# Patient Record
Sex: FEMALE | Race: WHITE | NOT HISPANIC OR LATINO
[De-identification: names, ages, dates, MRNs, and addresses within clinical notes are randomized per-mention and may not be internally consistent; named-entity substitution may affect disease eponyms.]

---

## 2017-03-16 ENCOUNTER — RESULT REVIEW (OUTPATIENT)
Age: 67
End: 2017-03-16

## 2017-06-30 ENCOUNTER — APPOINTMENT (OUTPATIENT)
Dept: GASTROENTEROLOGY | Facility: CLINIC | Age: 67
End: 2017-06-30

## 2017-06-30 DIAGNOSIS — K57.30 DIVERTICULOSIS OF LARGE INTESTINE W/OUT PERFORATION OR ABSCESS W/OUT BLEEDING: ICD-10-CM

## 2017-07-30 LAB
ALBUMIN SERPL ELPH-MCNC: 4.9 G/DL
ALP BLD-CCNC: 80 U/L
ALT SERPL-CCNC: 40 U/L
ANION GAP SERPL CALC-SCNC: 16 MMOL/L
AST SERPL-CCNC: 31 U/L
BASOPHILS # BLD AUTO: 0.02 K/UL
BASOPHILS NFR BLD AUTO: 0.3 %
BILIRUB SERPL-MCNC: 0.6 MG/DL
BUN SERPL-MCNC: 13 MG/DL
CALCIUM SERPL-MCNC: 10 MG/DL
CHLORIDE SERPL-SCNC: 100 MMOL/L
CO2 SERPL-SCNC: 25 MMOL/L
CREAT SERPL-MCNC: 0.88 MG/DL
EOSINOPHIL # BLD AUTO: 0.09 K/UL
EOSINOPHIL NFR BLD AUTO: 1.3 %
GLUCOSE SERPL-MCNC: 77 MG/DL
HCT VFR BLD CALC: 45.9 %
HGB BLD-MCNC: 15.5 G/DL
IMM GRANULOCYTES NFR BLD AUTO: 0.4 %
INR PPP: 0.91 RATIO
LYMPHOCYTES # BLD AUTO: 2.08 K/UL
LYMPHOCYTES NFR BLD AUTO: 29.1 %
MAN DIFF?: NORMAL
MCHC RBC-ENTMCNC: 30.6 PG
MCHC RBC-ENTMCNC: 33.8 GM/DL
MCV RBC AUTO: 90.5 FL
MONOCYTES # BLD AUTO: 0.55 K/UL
MONOCYTES NFR BLD AUTO: 7.7 %
NEUTROPHILS # BLD AUTO: 4.38 K/UL
NEUTROPHILS NFR BLD AUTO: 61.2 %
PLATELET # BLD AUTO: 138 K/UL
POTASSIUM SERPL-SCNC: 4.8 MMOL/L
PROT SERPL-MCNC: 7.6 G/DL
PT BLD: 10.3 SEC
RBC # BLD: 5.07 M/UL
RBC # FLD: 12.7 %
SODIUM SERPL-SCNC: 141 MMOL/L
WBC # FLD AUTO: 7.15 K/UL

## 2017-08-08 ENCOUNTER — OUTPATIENT (OUTPATIENT)
Dept: OUTPATIENT SERVICES | Facility: HOSPITAL | Age: 67
LOS: 1 days | End: 2017-08-08
Payer: MEDICARE

## 2017-08-08 ENCOUNTER — APPOINTMENT (OUTPATIENT)
Dept: GASTROENTEROLOGY | Facility: GI CENTER | Age: 67
End: 2017-08-08
Payer: MEDICARE

## 2017-08-08 DIAGNOSIS — Z12.11 ENCOUNTER FOR SCREENING FOR MALIGNANT NEOPLASM OF COLON: ICD-10-CM

## 2017-08-08 PROCEDURE — 45378 DIAGNOSTIC COLONOSCOPY: CPT

## 2017-08-08 PROCEDURE — G0105: CPT

## 2018-04-16 ENCOUNTER — RESULT REVIEW (OUTPATIENT)
Age: 68
End: 2018-04-16

## 2019-02-04 ENCOUNTER — TRANSCRIPTION ENCOUNTER (OUTPATIENT)
Age: 69
End: 2019-02-04

## 2019-04-24 ENCOUNTER — APPOINTMENT (OUTPATIENT)
Dept: RADIOLOGY | Facility: CLINIC | Age: 69
End: 2019-04-24
Payer: MEDICARE

## 2019-04-24 ENCOUNTER — OUTPATIENT (OUTPATIENT)
Dept: OUTPATIENT SERVICES | Facility: HOSPITAL | Age: 69
LOS: 1 days | End: 2019-04-24
Payer: MEDICARE

## 2019-04-24 ENCOUNTER — APPOINTMENT (OUTPATIENT)
Dept: MAMMOGRAPHY | Facility: CLINIC | Age: 69
End: 2019-04-24
Payer: MEDICARE

## 2019-04-24 DIAGNOSIS — Z12.31 ENCOUNTER FOR SCREENING MAMMOGRAM FOR MALIGNANT NEOPLASM OF BREAST: ICD-10-CM

## 2019-04-24 DIAGNOSIS — Z13.820 ENCOUNTER FOR SCREENING FOR OSTEOPOROSIS: ICD-10-CM

## 2019-04-24 PROCEDURE — 77067 SCR MAMMO BI INCL CAD: CPT | Mod: 26

## 2019-04-24 PROCEDURE — 77063 BREAST TOMOSYNTHESIS BI: CPT | Mod: 26

## 2019-04-24 PROCEDURE — 77067 SCR MAMMO BI INCL CAD: CPT

## 2019-04-24 PROCEDURE — 77080 DXA BONE DENSITY AXIAL: CPT | Mod: 26

## 2019-04-24 PROCEDURE — 77080 DXA BONE DENSITY AXIAL: CPT

## 2019-04-24 PROCEDURE — 77063 BREAST TOMOSYNTHESIS BI: CPT

## 2020-06-23 ENCOUNTER — RESULT REVIEW (OUTPATIENT)
Age: 70
End: 2020-06-23

## 2020-07-09 ENCOUNTER — APPOINTMENT (OUTPATIENT)
Dept: MAMMOGRAPHY | Facility: CLINIC | Age: 70
End: 2020-07-09
Payer: MEDICARE

## 2020-07-09 ENCOUNTER — OUTPATIENT (OUTPATIENT)
Dept: OUTPATIENT SERVICES | Facility: HOSPITAL | Age: 70
LOS: 1 days | End: 2020-07-09
Payer: MEDICARE

## 2020-07-09 DIAGNOSIS — Z12.31 ENCOUNTER FOR SCREENING MAMMOGRAM FOR MALIGNANT NEOPLASM OF BREAST: ICD-10-CM

## 2020-07-09 PROCEDURE — 77063 BREAST TOMOSYNTHESIS BI: CPT | Mod: 26

## 2020-07-09 PROCEDURE — 77067 SCR MAMMO BI INCL CAD: CPT | Mod: 26

## 2020-07-09 PROCEDURE — 77067 SCR MAMMO BI INCL CAD: CPT

## 2020-07-09 PROCEDURE — 77063 BREAST TOMOSYNTHESIS BI: CPT

## 2020-10-02 ENCOUNTER — RESULT REVIEW (OUTPATIENT)
Age: 70
End: 2020-10-02

## 2021-01-27 ENCOUNTER — APPOINTMENT (OUTPATIENT)
Dept: DERMATOLOGY | Facility: CLINIC | Age: 71
End: 2021-01-27
Payer: MEDICARE

## 2021-01-27 ENCOUNTER — RESULT REVIEW (OUTPATIENT)
Age: 71
End: 2021-01-27

## 2021-01-27 PROCEDURE — 99203 OFFICE O/P NEW LOW 30 MIN: CPT | Mod: 25

## 2021-01-27 PROCEDURE — 17261 DSTRJ MAL LES T/A/L .6-1.0CM: CPT

## 2021-01-27 PROCEDURE — 11306 SHAVE SKIN LESION 0.6-1.0 CM: CPT

## 2021-01-27 PROCEDURE — 99072 ADDL SUPL MATRL&STAF TM PHE: CPT

## 2021-02-12 ENCOUNTER — TRANSCRIPTION ENCOUNTER (OUTPATIENT)
Age: 71
End: 2021-02-12

## 2021-06-25 ENCOUNTER — RESULT REVIEW (OUTPATIENT)
Age: 71
End: 2021-06-25

## 2021-07-12 ENCOUNTER — APPOINTMENT (OUTPATIENT)
Dept: MAMMOGRAPHY | Facility: CLINIC | Age: 71
End: 2021-07-12
Payer: MEDICARE

## 2021-07-12 ENCOUNTER — OUTPATIENT (OUTPATIENT)
Dept: OUTPATIENT SERVICES | Facility: HOSPITAL | Age: 71
LOS: 1 days | End: 2021-07-12
Payer: MEDICARE

## 2021-07-12 ENCOUNTER — APPOINTMENT (OUTPATIENT)
Dept: RADIOLOGY | Facility: CLINIC | Age: 71
End: 2021-07-12
Payer: MEDICARE

## 2021-07-12 DIAGNOSIS — Z12.31 ENCOUNTER FOR SCREENING MAMMOGRAM FOR MALIGNANT NEOPLASM OF BREAST: ICD-10-CM

## 2021-07-12 DIAGNOSIS — Z13.820 ENCOUNTER FOR SCREENING FOR OSTEOPOROSIS: ICD-10-CM

## 2021-07-12 DIAGNOSIS — Z00.8 ENCOUNTER FOR OTHER GENERAL EXAMINATION: ICD-10-CM

## 2021-07-12 PROCEDURE — 77063 BREAST TOMOSYNTHESIS BI: CPT | Mod: 26

## 2021-07-12 PROCEDURE — 77063 BREAST TOMOSYNTHESIS BI: CPT

## 2021-07-12 PROCEDURE — 77067 SCR MAMMO BI INCL CAD: CPT | Mod: 26

## 2021-07-12 PROCEDURE — 77080 DXA BONE DENSITY AXIAL: CPT | Mod: 26

## 2021-07-12 PROCEDURE — 77067 SCR MAMMO BI INCL CAD: CPT

## 2021-07-12 PROCEDURE — 77080 DXA BONE DENSITY AXIAL: CPT

## 2022-03-31 ENCOUNTER — TRANSCRIPTION ENCOUNTER (OUTPATIENT)
Age: 72
End: 2022-03-31

## 2022-04-17 ENCOUNTER — TRANSCRIPTION ENCOUNTER (OUTPATIENT)
Age: 72
End: 2022-04-17

## 2022-05-16 ENCOUNTER — INPATIENT (INPATIENT)
Facility: HOSPITAL | Age: 72
LOS: 1 days | Discharge: HOME | DRG: 419 | End: 2022-05-18
Attending: SURGERY | Admitting: SURGERY
Payer: MEDICARE

## 2022-05-16 ENCOUNTER — TRANSCRIPTION ENCOUNTER (OUTPATIENT)
Age: 72
End: 2022-05-16

## 2022-05-16 VITALS
HEART RATE: 75 BPM | HEIGHT: 65 IN | DIASTOLIC BLOOD PRESSURE: 67 MMHG | TEMPERATURE: 98 F | SYSTOLIC BLOOD PRESSURE: 166 MMHG | OXYGEN SATURATION: 98 % | WEIGHT: 130.07 LBS | RESPIRATION RATE: 20 BRPM

## 2022-05-16 DIAGNOSIS — Z90.721 ACQUIRED ABSENCE OF OVARIES, UNILATERAL: Chronic | ICD-10-CM

## 2022-05-16 DIAGNOSIS — Z98.51 TUBAL LIGATION STATUS: Chronic | ICD-10-CM

## 2022-05-16 DIAGNOSIS — K81.9 CHOLECYSTITIS, UNSPECIFIED: ICD-10-CM

## 2022-05-16 LAB
ALBUMIN SERPL ELPH-MCNC: 4.3 G/DL — SIGNIFICANT CHANGE UP (ref 3.3–5.2)
ALP SERPL-CCNC: 85 U/L — SIGNIFICANT CHANGE UP (ref 40–120)
ALT FLD-CCNC: 35 U/L — HIGH
ANION GAP SERPL CALC-SCNC: 15 MMOL/L — SIGNIFICANT CHANGE UP (ref 5–17)
AST SERPL-CCNC: 33 U/L — HIGH
BASOPHILS # BLD AUTO: 0.05 K/UL — SIGNIFICANT CHANGE UP (ref 0–0.2)
BASOPHILS NFR BLD AUTO: 0.4 % — SIGNIFICANT CHANGE UP (ref 0–2)
BILIRUB SERPL-MCNC: 0.5 MG/DL — SIGNIFICANT CHANGE UP (ref 0.4–2)
BLD GP AB SCN SERPL QL: SIGNIFICANT CHANGE UP
BUN SERPL-MCNC: 12.6 MG/DL — SIGNIFICANT CHANGE UP (ref 8–20)
CALCIUM SERPL-MCNC: 9.2 MG/DL — SIGNIFICANT CHANGE UP (ref 8.6–10.2)
CHLORIDE SERPL-SCNC: 102 MMOL/L — SIGNIFICANT CHANGE UP (ref 98–107)
CO2 SERPL-SCNC: 23 MMOL/L — SIGNIFICANT CHANGE UP (ref 22–29)
CREAT SERPL-MCNC: 0.7 MG/DL — SIGNIFICANT CHANGE UP (ref 0.5–1.3)
EGFR: 91 ML/MIN/1.73M2 — SIGNIFICANT CHANGE UP
EOSINOPHIL # BLD AUTO: 0.02 K/UL — SIGNIFICANT CHANGE UP (ref 0–0.5)
EOSINOPHIL NFR BLD AUTO: 0.2 % — SIGNIFICANT CHANGE UP (ref 0–6)
GLUCOSE SERPL-MCNC: 127 MG/DL — HIGH (ref 70–99)
HCT VFR BLD CALC: 50.8 % — HIGH (ref 34.5–45)
HGB BLD-MCNC: 16.9 G/DL — HIGH (ref 11.5–15.5)
IMM GRANULOCYTES NFR BLD AUTO: 0.4 % — SIGNIFICANT CHANGE UP (ref 0–1.5)
LIDOCAIN IGE QN: 22 U/L — SIGNIFICANT CHANGE UP (ref 22–51)
LYMPHOCYTES # BLD AUTO: 1.18 K/UL — SIGNIFICANT CHANGE UP (ref 1–3.3)
LYMPHOCYTES # BLD AUTO: 10.2 % — LOW (ref 13–44)
MCHC RBC-ENTMCNC: 30.1 PG — SIGNIFICANT CHANGE UP (ref 27–34)
MCHC RBC-ENTMCNC: 33.3 GM/DL — SIGNIFICANT CHANGE UP (ref 32–36)
MCV RBC AUTO: 90.6 FL — SIGNIFICANT CHANGE UP (ref 80–100)
MONOCYTES # BLD AUTO: 0.48 K/UL — SIGNIFICANT CHANGE UP (ref 0–0.9)
MONOCYTES NFR BLD AUTO: 4.1 % — SIGNIFICANT CHANGE UP (ref 2–14)
NEUTROPHILS # BLD AUTO: 9.82 K/UL — HIGH (ref 1.8–7.4)
NEUTROPHILS NFR BLD AUTO: 84.7 % — HIGH (ref 43–77)
PLATELET # BLD AUTO: 169 K/UL — SIGNIFICANT CHANGE UP (ref 150–400)
POTASSIUM SERPL-MCNC: 4.1 MMOL/L — SIGNIFICANT CHANGE UP (ref 3.5–5.3)
POTASSIUM SERPL-SCNC: 4.1 MMOL/L — SIGNIFICANT CHANGE UP (ref 3.5–5.3)
PROT SERPL-MCNC: 7.4 G/DL — SIGNIFICANT CHANGE UP (ref 6.6–8.7)
RBC # BLD: 5.61 M/UL — HIGH (ref 3.8–5.2)
RBC # FLD: 12.2 % — SIGNIFICANT CHANGE UP (ref 10.3–14.5)
SARS-COV-2 RNA SPEC QL NAA+PROBE: SIGNIFICANT CHANGE UP
SODIUM SERPL-SCNC: 140 MMOL/L — SIGNIFICANT CHANGE UP (ref 135–145)
WBC # BLD: 11.6 K/UL — HIGH (ref 3.8–10.5)
WBC # FLD AUTO: 11.6 K/UL — HIGH (ref 3.8–10.5)

## 2022-05-16 PROCEDURE — 76705 ECHO EXAM OF ABDOMEN: CPT | Mod: 26

## 2022-05-16 PROCEDURE — 71045 X-RAY EXAM CHEST 1 VIEW: CPT | Mod: 26

## 2022-05-16 PROCEDURE — 74181 MRI ABDOMEN W/O CONTRAST: CPT | Mod: 26

## 2022-05-16 PROCEDURE — 99285 EMERGENCY DEPT VISIT HI MDM: CPT

## 2022-05-16 RX ORDER — OXYCODONE HYDROCHLORIDE 5 MG/1
5 TABLET ORAL EVERY 6 HOURS
Refills: 0 | Status: DISCONTINUED | OUTPATIENT
Start: 2022-05-16 | End: 2022-05-17

## 2022-05-16 RX ORDER — CEFOTETAN DISODIUM 1 G
VIAL (EA) INJECTION
Refills: 0 | Status: DISCONTINUED | OUTPATIENT
Start: 2022-05-16 | End: 2022-05-17

## 2022-05-16 RX ORDER — ENOXAPARIN SODIUM 100 MG/ML
40 INJECTION SUBCUTANEOUS EVERY 24 HOURS
Refills: 0 | Status: DISCONTINUED | OUTPATIENT
Start: 2022-05-16 | End: 2022-05-17

## 2022-05-16 RX ORDER — ACETAMINOPHEN 500 MG
650 TABLET ORAL EVERY 6 HOURS
Refills: 0 | Status: DISCONTINUED | OUTPATIENT
Start: 2022-05-16 | End: 2022-05-17

## 2022-05-16 RX ORDER — SODIUM CHLORIDE 9 MG/ML
1000 INJECTION, SOLUTION INTRAVENOUS
Refills: 0 | Status: DISCONTINUED | OUTPATIENT
Start: 2022-05-16 | End: 2022-05-17

## 2022-05-16 RX ORDER — TRAMADOL HYDROCHLORIDE 50 MG/1
50 TABLET ORAL EVERY 6 HOURS
Refills: 0 | Status: DISCONTINUED | OUTPATIENT
Start: 2022-05-16 | End: 2022-05-17

## 2022-05-16 RX ORDER — CEFOTETAN DISODIUM 1 G
2 VIAL (EA) INJECTION ONCE
Refills: 0 | Status: COMPLETED | OUTPATIENT
Start: 2022-05-16 | End: 2022-05-16

## 2022-05-16 RX ORDER — CEFOTETAN DISODIUM 1 G
2 VIAL (EA) INJECTION EVERY 12 HOURS
Refills: 0 | Status: DISCONTINUED | OUTPATIENT
Start: 2022-05-17 | End: 2022-05-17

## 2022-05-16 RX ORDER — KETOROLAC TROMETHAMINE 30 MG/ML
30 SYRINGE (ML) INJECTION ONCE
Refills: 0 | Status: DISCONTINUED | OUTPATIENT
Start: 2022-05-16 | End: 2022-05-16

## 2022-05-16 RX ORDER — MORPHINE SULFATE 50 MG/1
2 CAPSULE, EXTENDED RELEASE ORAL ONCE
Refills: 0 | Status: DISCONTINUED | OUTPATIENT
Start: 2022-05-16 | End: 2022-05-16

## 2022-05-16 RX ORDER — PIPERACILLIN AND TAZOBACTAM 4; .5 G/20ML; G/20ML
3.38 INJECTION, POWDER, LYOPHILIZED, FOR SOLUTION INTRAVENOUS ONCE
Refills: 0 | Status: DISCONTINUED | OUTPATIENT
Start: 2022-05-16 | End: 2022-05-16

## 2022-05-16 RX ORDER — ATORVASTATIN CALCIUM 80 MG/1
20 TABLET, FILM COATED ORAL AT BEDTIME
Refills: 0 | Status: DISCONTINUED | OUTPATIENT
Start: 2022-05-16 | End: 2022-05-17

## 2022-05-16 RX ORDER — SODIUM CHLORIDE 9 MG/ML
1000 INJECTION INTRAMUSCULAR; INTRAVENOUS; SUBCUTANEOUS ONCE
Refills: 0 | Status: COMPLETED | OUTPATIENT
Start: 2022-05-16 | End: 2022-05-16

## 2022-05-16 RX ADMIN — Medication 30 MILLIGRAM(S): at 09:21

## 2022-05-16 RX ADMIN — TRAMADOL HYDROCHLORIDE 50 MILLIGRAM(S): 50 TABLET ORAL at 22:02

## 2022-05-16 RX ADMIN — MORPHINE SULFATE 2 MILLIGRAM(S): 50 CAPSULE, EXTENDED RELEASE ORAL at 09:20

## 2022-05-16 RX ADMIN — ATORVASTATIN CALCIUM 20 MILLIGRAM(S): 80 TABLET, FILM COATED ORAL at 21:03

## 2022-05-16 RX ADMIN — SODIUM CHLORIDE 75 MILLILITER(S): 9 INJECTION, SOLUTION INTRAVENOUS at 14:13

## 2022-05-16 RX ADMIN — SODIUM CHLORIDE 1000 MILLILITER(S): 9 INJECTION INTRAMUSCULAR; INTRAVENOUS; SUBCUTANEOUS at 09:21

## 2022-05-16 RX ADMIN — Medication 100 GRAM(S): at 17:00

## 2022-05-16 RX ADMIN — ENOXAPARIN SODIUM 40 MILLIGRAM(S): 100 INJECTION SUBCUTANEOUS at 17:00

## 2022-05-16 RX ADMIN — TRAMADOL HYDROCHLORIDE 50 MILLIGRAM(S): 50 TABLET ORAL at 21:02

## 2022-05-16 NOTE — ED ADULT NURSE REASSESSMENT NOTE - NS ED NURSE REASSESS COMMENT FT1
report given to everett kelly at 18:50. pt moved to cdu 7 L. rr even and unlabored. rn made aware of transfer. pt educated on plan of care, pt able to successfully teach back plan of care to RN, RN will continue to reeducate pt during hospital stay.

## 2022-05-16 NOTE — H&P ADULT - ASSESSMENT
73yo F w/ PMH of hypercholesterolemia presents with one day of abdominal pain,, found with cholelithiasis/sludge and diffuse GBW thickening. These findings as well as leukocytosis and RUQ pain concerning for acute cholecystitis. Plan for cholecystectomy which patient is agreeable to. Also noted with significantly dilated CBD without obstructive lab findings. TO obtain MRCP to r/o structural causes fo dilation.    # Acute cholecystitis  - Admit to [facility] under Dr. Abreu  - Plan for OR tomorrow 5/17 for lap quique, poss open  - CLD, NPO at midnight / IVF  - IV abx  - Pain control PRN  - DVT ppx  - Resumed pertinent home meds  - MRCP ordered, will f/u    Patient seen and plan discussed with Dr. Abreu who agrees.   Consulted at 12:00 seen at 12:45.

## 2022-05-16 NOTE — ED STATDOCS - ATTENDING APP SHARED VISIT CONTRIBUTION OF CARE
This was a shared visit with TEREZA. I reviewed and verified the documentation and independently performed the documented history/exam/mdm.

## 2022-05-16 NOTE — H&P ADULT - NSHPSOCIALHISTORY_GEN_ALL_CORE
Smoked 5-10 cigarette / day, off and on but for a total of about 20 years. Drinks alcohol socially: 2-3 glasses of wine weekly.  Denies illicit drug use.

## 2022-05-16 NOTE — ED ADULT NURSE NOTE - NSIMPLEMENTINTERV_GEN_ALL_ED
Implemented All Universal Safety Interventions:  [place] to call system. Call bell, personal items and telephone within reach. Instruct patient to call for assistance. Room bathroom lighting operational. Non-slip footwear when patient is off stretcher. Physically safe environment: no spills, clutter or unnecessary equipment. Stretcher in lowest position, wheels locked, appropriate side rails in place.

## 2022-05-16 NOTE — H&P ADULT - NSICDXPASTSURGICALHX_GEN_ALL_CORE_FT
PAST SURGICAL HISTORY:  H/O tubal ligation     History of right salpingo-oophorectomy 2/2 ectopic pregnancy

## 2022-05-16 NOTE — ED ADULT NURSE NOTE - OBJECTIVE STATEMENT
assumed care of pt at 9:17. pt reports lower abd pain radiating to right flank since Thursday night. pt reports 10/10 sharp pain. n/v present. rr even and unlabored. anox3. pt educated on plan of care, pt able to successfully teach back plan of care to RN, RN will continue to reeducate pt during hospital stay.

## 2022-05-16 NOTE — ED STATDOCS - PROGRESS NOTE DETAILS
PT evaluated by intake physician. HPI/PE/ROS as noted above. Will follow up plan per intake physician. surgery consult called, MD to see pt at bedside

## 2022-05-16 NOTE — H&P ADULT - HISTORY OF PRESENT ILLNESS
71yo F w/ PMH of hypercholesterolemia presents with one day of abdominal pain, located at epigastrium and RUQ. Pain is described as sharp and at times 10/10. Began last night sometime after dinner. Had a similar pain last Thursday night and tried Gas-x with minimal relief, though this episode resolved by next morning. This time pain was more severe. Associated with nausea and dry-heaving. Denies vomiting, fevers, chills, chest pain, or SOB. Normal BM and urination. Last colonoscopy about 5 years ago, notable for polyps. Next -c-scope scheduled for this August.

## 2022-05-16 NOTE — ED ADULT TRIAGE NOTE - TEMPERATURE IN CELSIUS (DEGREES C)
Have we ever prescribed this med? Yes.  If yes, what date? 03/19/2018    Last OV: 08/04/2017 - Christi Hinojosa    Next OV: none scheduled; was to follow up in 3 months     DX: COPD    Medications: Spiriva, Proair    36.7

## 2022-05-16 NOTE — H&P ADULT - NSHPLABSRESULTS_GEN_ALL_CORE
Vital Signs Last 24 Hrs  T(C): 36.7 (16 May 2022 07:53), Max: 36.7 (16 May 2022 07:53)  T(F): 98 (16 May 2022 07:53), Max: 98 (16 May 2022 07:53)  HR: 89 (16 May 2022 14:12) (65 - 89)  BP: 153/62 (16 May 2022 14:12) (153/62 - 166/67)  BP(mean): --  RR: 18 (16 May 2022 14:12) (18 - 20)  SpO2: 100% (16 May 2022 14:12) (98% - 100%)        LABS:                        16.9   11.60 )-----------( 169      ( 16 May 2022 09:40 )             50.8     05-16    140  |  102  |  12.6  ----------------------------<  127<H>  4.1   |  23.0  |  0.70    Ca    9.2      16 May 2022 09:40    TPro  7.4  /  Alb  4.3  /  TBili  0.5  /  DBili  x   /  AST  33<H>  /  ALT  35<H>  /  AlkPhos  85  05-16        IMAGING:  RUQ U/S  IMPRESSION:  Cholelithiasis. Constellation of findings concerning for acute cholecystitis.  Significant choledochomegaly ,correlate with LFTs and MRCP.  Fatty liver. Question focal fatty sparing versus hypoechoic lesion right hepatic lobe. Correlate with MR. ALBERTO WALKER MD; Attending Radiologist  This document has been electronically signed. May 16 2022 11:45AM

## 2022-05-16 NOTE — H&P ADULT - NSHPPHYSICALEXAM_GEN_ALL_CORE
GENERAL: Laying in stretcher chair in NAD, follows commands, answering appropriately.   HEAD:  Atraumatic, Normocephalic  EYES: EOMI, conjunctiva and sclera clear  NECK: Supple, full ROM  CHEST/LUNG: Unlabored, no conversational dyspnea  HEART: Regular rate and rhythm;   ABDOMEN: Soft, Nondistended; Moderately tender at RUQ, mild at epigastrium. No rebound or guarding. Negative Mendez's  EXTREMITIES:  Full ROM, WWP.  No clubbing, cyanosis, or edema  PSYCH: AAOx3  NEUROLOGY: non-focal  SKIN: No rashes or lesions

## 2022-05-16 NOTE — ED STATDOCS - OBJECTIVE STATEMENT
72y female with a PMHx of HLD, ectopic pregnancy, tubal ligation presents to the ED c/o RUQ abdominal pain radiating to the back with associated nausea x4 days. Pt at first thought it was related to her dinner (Chinese) Thursday night as her symptoms improved Friday. Pt then reports she had pizza for dinner last night and felt the pain return with associated nausea and bile-emesis. Pt admits to smoking on average 10 cigarettes per jayson for roughly 20 years and social EtOH use.     Pt denies diarrhea, urinary symptoms.

## 2022-05-16 NOTE — H&P ADULT - ATTENDING COMMENTS
Patient seen and examined with resident. Agree with note above. Pt TTP RUQ, findings concerning for acute cholecystitis. Dilated CBD noted, will evaluate with MRCP tonight. Posted for OR tomorrow for lap quique, poss open. Clears today, IV abx.

## 2022-05-16 NOTE — ED STATDOCS - NS ED ATTENDING STATEMENT MOD
This was a shared visit with the TEREZA. I reviewed and verified the documentation and independently performed the documented:

## 2022-05-17 ENCOUNTER — TRANSCRIPTION ENCOUNTER (OUTPATIENT)
Age: 72
End: 2022-05-17

## 2022-05-17 ENCOUNTER — RESULT REVIEW (OUTPATIENT)
Age: 72
End: 2022-05-17

## 2022-05-17 LAB
ALBUMIN SERPL ELPH-MCNC: 3.5 G/DL — SIGNIFICANT CHANGE UP (ref 3.3–5.2)
ALP SERPL-CCNC: 67 U/L — SIGNIFICANT CHANGE UP (ref 40–120)
ALT FLD-CCNC: 25 U/L — SIGNIFICANT CHANGE UP
ANION GAP SERPL CALC-SCNC: 14 MMOL/L — SIGNIFICANT CHANGE UP (ref 5–17)
APTT BLD: 28.9 SEC — SIGNIFICANT CHANGE UP (ref 27.5–35.5)
AST SERPL-CCNC: 23 U/L — SIGNIFICANT CHANGE UP
BASOPHILS # BLD AUTO: 0.05 K/UL — SIGNIFICANT CHANGE UP (ref 0–0.2)
BASOPHILS NFR BLD AUTO: 0.6 % — SIGNIFICANT CHANGE UP (ref 0–2)
BILIRUB DIRECT SERPL-MCNC: 0.1 MG/DL — SIGNIFICANT CHANGE UP (ref 0–0.3)
BILIRUB INDIRECT FLD-MCNC: 0.5 MG/DL — SIGNIFICANT CHANGE UP (ref 0.2–1)
BILIRUB SERPL-MCNC: 0.6 MG/DL — SIGNIFICANT CHANGE UP (ref 0.4–2)
BUN SERPL-MCNC: 8.2 MG/DL — SIGNIFICANT CHANGE UP (ref 8–20)
CALCIUM SERPL-MCNC: 8.5 MG/DL — LOW (ref 8.6–10.2)
CHLORIDE SERPL-SCNC: 106 MMOL/L — SIGNIFICANT CHANGE UP (ref 98–107)
CO2 SERPL-SCNC: 22 MMOL/L — SIGNIFICANT CHANGE UP (ref 22–29)
CREAT SERPL-MCNC: 0.6 MG/DL — SIGNIFICANT CHANGE UP (ref 0.5–1.3)
EGFR: 94 ML/MIN/1.73M2 — SIGNIFICANT CHANGE UP
EOSINOPHIL # BLD AUTO: 0.58 K/UL — HIGH (ref 0–0.5)
EOSINOPHIL NFR BLD AUTO: 7.4 % — HIGH (ref 0–6)
GLUCOSE SERPL-MCNC: 88 MG/DL — SIGNIFICANT CHANGE UP (ref 70–99)
HCT VFR BLD CALC: 40.6 % — SIGNIFICANT CHANGE UP (ref 34.5–45)
HCV AB S/CO SERPL IA: 0.46 S/CO — SIGNIFICANT CHANGE UP (ref 0–0.99)
HCV AB SERPL-IMP: SIGNIFICANT CHANGE UP
HGB BLD-MCNC: 13.5 G/DL — SIGNIFICANT CHANGE UP (ref 11.5–15.5)
IMM GRANULOCYTES NFR BLD AUTO: 0.4 % — SIGNIFICANT CHANGE UP (ref 0–1.5)
INR BLD: 1.05 RATIO — SIGNIFICANT CHANGE UP (ref 0.88–1.16)
LYMPHOCYTES # BLD AUTO: 2.98 K/UL — SIGNIFICANT CHANGE UP (ref 1–3.3)
LYMPHOCYTES # BLD AUTO: 38.2 % — SIGNIFICANT CHANGE UP (ref 13–44)
MAGNESIUM SERPL-MCNC: 2 MG/DL — SIGNIFICANT CHANGE UP (ref 1.6–2.6)
MCHC RBC-ENTMCNC: 30.7 PG — SIGNIFICANT CHANGE UP (ref 27–34)
MCHC RBC-ENTMCNC: 33.3 GM/DL — SIGNIFICANT CHANGE UP (ref 32–36)
MCV RBC AUTO: 92.3 FL — SIGNIFICANT CHANGE UP (ref 80–100)
MONOCYTES # BLD AUTO: 0.56 K/UL — SIGNIFICANT CHANGE UP (ref 0–0.9)
MONOCYTES NFR BLD AUTO: 7.2 % — SIGNIFICANT CHANGE UP (ref 2–14)
NEUTROPHILS # BLD AUTO: 3.6 K/UL — SIGNIFICANT CHANGE UP (ref 1.8–7.4)
NEUTROPHILS NFR BLD AUTO: 46.2 % — SIGNIFICANT CHANGE UP (ref 43–77)
PHOSPHATE SERPL-MCNC: 3.3 MG/DL — SIGNIFICANT CHANGE UP (ref 2.4–4.7)
PLATELET # BLD AUTO: 134 K/UL — LOW (ref 150–400)
POTASSIUM SERPL-MCNC: 3.5 MMOL/L — SIGNIFICANT CHANGE UP (ref 3.5–5.3)
POTASSIUM SERPL-SCNC: 3.5 MMOL/L — SIGNIFICANT CHANGE UP (ref 3.5–5.3)
PROT SERPL-MCNC: 5.8 G/DL — LOW (ref 6.6–8.7)
PROTHROM AB SERPL-ACNC: 12.2 SEC — SIGNIFICANT CHANGE UP (ref 10.5–13.4)
RBC # BLD: 4.4 M/UL — SIGNIFICANT CHANGE UP (ref 3.8–5.2)
RBC # FLD: 12.3 % — SIGNIFICANT CHANGE UP (ref 10.3–14.5)
SODIUM SERPL-SCNC: 141 MMOL/L — SIGNIFICANT CHANGE UP (ref 135–145)
WBC # BLD: 7.8 K/UL — SIGNIFICANT CHANGE UP (ref 3.8–10.5)
WBC # FLD AUTO: 7.8 K/UL — SIGNIFICANT CHANGE UP (ref 3.8–10.5)

## 2022-05-17 PROCEDURE — 47562 LAPAROSCOPIC CHOLECYSTECTOMY: CPT

## 2022-05-17 PROCEDURE — 88304 TISSUE EXAM BY PATHOLOGIST: CPT | Mod: 26

## 2022-05-17 DEVICE — CLIP APPLIER COVIDIEN ENDOCLIP III 5MM: Type: IMPLANTABLE DEVICE | Status: FUNCTIONAL

## 2022-05-17 RX ORDER — TRAMADOL HYDROCHLORIDE 50 MG/1
50 TABLET ORAL EVERY 6 HOURS
Refills: 0 | Status: DISCONTINUED | OUTPATIENT
Start: 2022-05-17 | End: 2022-05-18

## 2022-05-17 RX ORDER — OXYCODONE HYDROCHLORIDE 5 MG/1
5 TABLET ORAL EVERY 6 HOURS
Refills: 0 | Status: DISCONTINUED | OUTPATIENT
Start: 2022-05-17 | End: 2022-05-18

## 2022-05-17 RX ORDER — ONDANSETRON 8 MG/1
4 TABLET, FILM COATED ORAL ONCE
Refills: 0 | Status: COMPLETED | OUTPATIENT
Start: 2022-05-17 | End: 2022-05-17

## 2022-05-17 RX ORDER — ACETAMINOPHEN 500 MG
650 TABLET ORAL EVERY 6 HOURS
Refills: 0 | Status: DISCONTINUED | OUTPATIENT
Start: 2022-05-17 | End: 2022-05-18

## 2022-05-17 RX ORDER — SODIUM CHLORIDE 9 MG/ML
1000 INJECTION, SOLUTION INTRAVENOUS
Refills: 0 | Status: DISCONTINUED | OUTPATIENT
Start: 2022-05-17 | End: 2022-05-17

## 2022-05-17 RX ORDER — ENOXAPARIN SODIUM 100 MG/ML
40 INJECTION SUBCUTANEOUS EVERY 24 HOURS
Refills: 0 | Status: DISCONTINUED | OUTPATIENT
Start: 2022-05-17 | End: 2022-05-18

## 2022-05-17 RX ORDER — HYDROMORPHONE HYDROCHLORIDE 2 MG/ML
0.5 INJECTION INTRAMUSCULAR; INTRAVENOUS; SUBCUTANEOUS
Refills: 0 | Status: DISCONTINUED | OUTPATIENT
Start: 2022-05-17 | End: 2022-05-17

## 2022-05-17 RX ORDER — ACETAMINOPHEN 500 MG
2 TABLET ORAL
Qty: 0 | Refills: 0 | DISCHARGE
Start: 2022-05-17

## 2022-05-17 RX ORDER — ATORVASTATIN CALCIUM 80 MG/1
20 TABLET, FILM COATED ORAL AT BEDTIME
Refills: 0 | Status: DISCONTINUED | OUTPATIENT
Start: 2022-05-17 | End: 2022-05-18

## 2022-05-17 RX ADMIN — ONDANSETRON 4 MILLIGRAM(S): 8 TABLET, FILM COATED ORAL at 18:47

## 2022-05-17 RX ADMIN — Medication 650 MILLIGRAM(S): at 23:30

## 2022-05-17 RX ADMIN — ATORVASTATIN CALCIUM 20 MILLIGRAM(S): 80 TABLET, FILM COATED ORAL at 22:32

## 2022-05-17 RX ADMIN — Medication 650 MILLIGRAM(S): at 06:04

## 2022-05-17 RX ADMIN — HYDROMORPHONE HYDROCHLORIDE 0.5 MILLIGRAM(S): 2 INJECTION INTRAMUSCULAR; INTRAVENOUS; SUBCUTANEOUS at 14:17

## 2022-05-17 RX ADMIN — ONDANSETRON 4 MILLIGRAM(S): 8 TABLET, FILM COATED ORAL at 14:00

## 2022-05-17 RX ADMIN — Medication 650 MILLIGRAM(S): at 22:30

## 2022-05-17 RX ADMIN — Medication 650 MILLIGRAM(S): at 05:31

## 2022-05-17 RX ADMIN — HYDROMORPHONE HYDROCHLORIDE 0.5 MILLIGRAM(S): 2 INJECTION INTRAMUSCULAR; INTRAVENOUS; SUBCUTANEOUS at 14:07

## 2022-05-17 RX ADMIN — Medication 650 MILLIGRAM(S): at 18:49

## 2022-05-17 RX ADMIN — Medication 650 MILLIGRAM(S): at 17:25

## 2022-05-17 RX ADMIN — Medication 100 GRAM(S): at 05:35

## 2022-05-17 RX ADMIN — SODIUM CHLORIDE 75 MILLILITER(S): 9 INJECTION, SOLUTION INTRAVENOUS at 13:34

## 2022-05-17 RX ADMIN — HYDROMORPHONE HYDROCHLORIDE 0.5 MILLIGRAM(S): 2 INJECTION INTRAMUSCULAR; INTRAVENOUS; SUBCUTANEOUS at 14:40

## 2022-05-17 RX ADMIN — Medication 650 MILLIGRAM(S): at 00:37

## 2022-05-17 RX ADMIN — Medication 650 MILLIGRAM(S): at 02:01

## 2022-05-17 NOTE — PROGRESS NOTE ADULT - SUBJECTIVE AND OBJECTIVE BOX
SUBJECTIVE/24 hour events:  73yo F w/ PMH of hypercholesterolemia presents with one day of abdominal pain, located at epigastrium and RUQ. Patient had an ultrasound of the RUQ and found to have acute cholecystitis and CBD dilation. Patient will be going to the OR today for a lap quique. There were no acute events overnight, on IV abx, no pain issues, NPO, covid negative, MRCP completed results pending      Vital Signs Last 24 Hrs  T(C): 36.8 (16 May 2022 19:33), Max: 36.8 (16 May 2022 19:33)  T(F): 98.2 (16 May 2022 19:33), Max: 98.2 (16 May 2022 19:33)  HR: 61 (16 May 2022 19:33) (61 - 89)  BP: 144/71 (16 May 2022 19:33) (144/71 - 166/67)  BP(mean): --  RR: 18 (16 May 2022 19:33) (18 - 20)  SpO2: 97% (16 May 2022 19:33) (97% - 100%)  Drug Dosing Weight  Height (cm): 165.1 (16 May 2022 07:53)  Weight (kg): 59 (16 May 2022 07:53)  BMI (kg/m2): 21.6 (16 May 2022 07:53)  BSA (m2): 1.65 (16 May 2022 07:53)  I&O's Detail    Allergies    No Known Allergies    Intolerances                              16.9   11.60 )-----------( 169      ( 16 May 2022 09:40 )             50.8   05-16    140  |  102  |  12.6  ----------------------------<  127<H>  4.1   |  23.0  |  0.70    Ca    9.2      16 May 2022 09:40    TPro  7.4  /  Alb  4.3  /  TBili  0.5  /  DBili  x   /  AST  33<H>  /  ALT  35<H>  /  AlkPhos  85  05-16      ROS:    PHYSICAL EXAM:  GENERAL: Laying in stretcher chair in NAD, follows commands, answering appropriately.   HEAD:  Atraumatic, Normocephalic  CHEST/LUNG: Unlabored, no conversational dyspnea  ABDOMEN: Soft, Nondistended; Moderately tender at RUQ, mild at epigastrium. No rebound or guarding. Negative Mendez's  NEUROLOGY: non-focal  SKIN: No rashes or lesios      MEDICATIONS  (STANDING):  acetaminophen     Tablet .. 650 milliGRAM(s) Oral every 6 hours  atorvastatin 20 milliGRAM(s) Oral at bedtime  cefoTEtan  IVPB 2 Gram(s) IV Intermittent every 12 hours  cefoTEtan  IVPB      enoxaparin Injectable 40 milliGRAM(s) SubCutaneous every 24 hours  lactated ringers. 1000 milliLiter(s) (75 mL/Hr) IV Continuous <Continuous>    MEDICATIONS  (PRN):  oxyCODONE    IR 5 milliGRAM(s) Oral every 6 hours PRN Severe Pain (7 - 10)  traMADol 50 milliGRAM(s) Oral every 6 hours PRN Moderate Pain (4 - 6)      RADIOLOGY STUDIES:    CULTURES:

## 2022-05-17 NOTE — DISCHARGE NOTE PROVIDER - NSDCMRMEDTOKEN_GEN_ALL_CORE_FT
acetaminophen 325 mg oral tablet: 2 tab(s) orally every 6 hours  atorvastatin 20 mg oral tablet: 1 tab(s) orally once a day (at bedtime)

## 2022-05-17 NOTE — DISCHARGE NOTE PROVIDER - NSFOLLOWUPCLINICS_GEN_ALL_ED_FT
[facility] Acute Care Surgery  Acute Care Surgery  [address] [place] [id number]  Phone: [phone]  Fax:

## 2022-05-17 NOTE — DISCHARGE NOTE PROVIDER - NSDCCPCAREPLAN_GEN_ALL_CORE_FT
PRINCIPAL DISCHARGE DIAGNOSIS  Diagnosis: Cholecystitis  Assessment and Plan of Treatment:   BATHING: Please do not submerge wound underwater. You may shower and/or sponge bathe.   ACTIVITY: No heavy lifting or straining. Otherwise, you may return to your usual level of physical activity. If you are taking narcotic pain medication (such as Percocet) DO NOT drive a car, operate machinery or make important decisions.  DIET: Return to your usual diet.  NOTIFY YOUR SURGEON IF: You have any bleeding that does not stop, any pus draining from your wound(s), any fever (over 100.4 F) or chills, persistent nausea/vomiting, persistent diarrhea, or if your pain is not controlled on your discharge pain medications.  FOLLOW-UP: Please follow up with your primary care physician and Acute Care Surgery clinic [phone] in 10-14 days regarding your hospitalization. Call for appointment upon discharge.

## 2022-05-17 NOTE — ASU DISCHARGE PLAN (ADULT/PEDIATRIC) - NS MD DC FALL RISK RISK
For information on Fall & Injury Prevention, visit: [email or web address] OR  [email or web address] OR  https://www.cdc.gov/steadi/patient.html

## 2022-05-17 NOTE — ASU DISCHARGE PLAN (ADULT/PEDIATRIC) - CARE PROVIDER_API CALL
Chon Cho)  Surgery  General  [address] [place] [address]  Phone: [phone]  Fax: [phone]  Established Patient  Follow Up Time: 2 weeks

## 2022-05-17 NOTE — DISCHARGE NOTE PROVIDER - HOSPITAL COURSE
Patient was admitted to undergo Laparoscopic Cholecystectomy.  The patient tolerated the procedure well and was transferred to the floor in stable condition.  The patient's diet was advanced PO pain medication ordered.  Once patient was ambulating well and voiding without difficulty, patient was found to be stable for discharge to home.  Pain was well-controlled at the time of discharge and the patient was tolerating a full diet.

## 2022-05-18 ENCOUNTER — TRANSCRIPTION ENCOUNTER (OUTPATIENT)
Age: 72
End: 2022-05-18

## 2022-05-18 VITALS
RESPIRATION RATE: 18 BRPM | DIASTOLIC BLOOD PRESSURE: 60 MMHG | TEMPERATURE: 97 F | SYSTOLIC BLOOD PRESSURE: 121 MMHG | HEART RATE: 93 BPM | OXYGEN SATURATION: 96 %

## 2022-05-18 PROCEDURE — 93005 ELECTROCARDIOGRAM TRACING: CPT

## 2022-05-18 PROCEDURE — 83690 ASSAY OF LIPASE: CPT

## 2022-05-18 PROCEDURE — 76705 ECHO EXAM OF ABDOMEN: CPT

## 2022-05-18 PROCEDURE — 84100 ASSAY OF PHOSPHORUS: CPT

## 2022-05-18 PROCEDURE — U0003: CPT

## 2022-05-18 PROCEDURE — 80076 HEPATIC FUNCTION PANEL: CPT

## 2022-05-18 PROCEDURE — 85025 COMPLETE CBC W/AUTO DIFF WBC: CPT

## 2022-05-18 PROCEDURE — 88304 TISSUE EXAM BY PATHOLOGIST: CPT

## 2022-05-18 PROCEDURE — 83735 ASSAY OF MAGNESIUM: CPT

## 2022-05-18 PROCEDURE — 99285 EMERGENCY DEPT VISIT HI MDM: CPT | Mod: 25

## 2022-05-18 PROCEDURE — 86901 BLOOD TYPING SEROLOGIC RH(D): CPT

## 2022-05-18 PROCEDURE — 86803 HEPATITIS C AB TEST: CPT

## 2022-05-18 PROCEDURE — 96375 TX/PRO/DX INJ NEW DRUG ADDON: CPT

## 2022-05-18 PROCEDURE — U0005: CPT

## 2022-05-18 PROCEDURE — 80048 BASIC METABOLIC PNL TOTAL CA: CPT

## 2022-05-18 PROCEDURE — 96374 THER/PROPH/DIAG INJ IV PUSH: CPT

## 2022-05-18 PROCEDURE — 74181 MRI ABDOMEN W/O CONTRAST: CPT | Mod: MG

## 2022-05-18 PROCEDURE — C9399: CPT

## 2022-05-18 PROCEDURE — 99024 POSTOP FOLLOW-UP VISIT: CPT

## 2022-05-18 PROCEDURE — 71045 X-RAY EXAM CHEST 1 VIEW: CPT

## 2022-05-18 PROCEDURE — 86850 RBC ANTIBODY SCREEN: CPT

## 2022-05-18 PROCEDURE — 80053 COMPREHEN METABOLIC PANEL: CPT

## 2022-05-18 PROCEDURE — 85730 THROMBOPLASTIN TIME PARTIAL: CPT

## 2022-05-18 PROCEDURE — G1004: CPT

## 2022-05-18 PROCEDURE — 85610 PROTHROMBIN TIME: CPT

## 2022-05-18 PROCEDURE — C1889: CPT

## 2022-05-18 PROCEDURE — 36415 COLL VENOUS BLD VENIPUNCTURE: CPT

## 2022-05-18 PROCEDURE — 86900 BLOOD TYPING SEROLOGIC ABO: CPT

## 2022-05-18 RX ADMIN — ENOXAPARIN SODIUM 40 MILLIGRAM(S): 100 INJECTION SUBCUTANEOUS at 05:06

## 2022-05-18 NOTE — PROGRESS NOTE ADULT - ASSESSMENT
73yo F w/ PMH of hypercholesterolemia presents with one day of abdominal pain,, found with cholelithiasis/sludge and diffuse GBW thickening. These findings as well as leukocytosis and RUQ pain concerning for acute cholecystitis. Plan for cholecystectomy which patient is agreeable to. Also noted with significantly dilated CBD without obstructive lab findings. TO obtain MRCP to r/o structural causes fo dilation.    # Acute cholecystitis  - Plan for OR today 5/17 for lap quique, poss open  -  NPO / IVF  - IV abx  - Pain control PRN  - DVT ppx  - Resumed pertinent home meds  - MRCP completed, resilts pending  
Patient presenting with acute cholecystitis, now pod#1 lap uqique  plan:  regular diet  dvt ppx  serial abdominal exams  monitor for return of bowel function   encourage oob  dvt ppx  incentive spirometer  if patient continues to tolerate a diet, able to ambulate with no difficulties and pain well controlled can be discharged home today after morning rounds

## 2022-05-18 NOTE — PROGRESS NOTE ADULT - ATTENDING COMMENTS
Recovering appropriately.   Ok to dc home. Dispo instructions verbally communicated to patient, to also receive paper version.

## 2022-05-18 NOTE — DISCHARGE NOTE NURSING/CASE MANAGEMENT/SOCIAL WORK - PATIENT PORTAL LINK FT
You can access the FollowMyHealth Patient Portal offered by [facility] by registering at the following website: [email or web address]. By joining [facility]’s FollowMyHealth portal, you will also be able to view your health information using other applications (apps) compatible with our system.

## 2022-05-18 NOTE — DISCHARGE NOTE NURSING/CASE MANAGEMENT/SOCIAL WORK - NSDCPEFALRISK_GEN_ALL_CORE
For information on Fall & Injury Prevention, visit: [email or web address] OR  [email or web address] OR  https://www.cdc.gov/steadi/patient.html

## 2022-05-18 NOTE — PROGRESS NOTE ADULT - SUBJECTIVE AND OBJECTIVE BOX
SUBJECTIVE/24 hour events:  Patient is a 72yFemale presenting with abdominal pain found to have acute cholecystitis. Patient now pod#1 lap quique. Patient with no acute events overnight, slowly tolerating a diet, pain well controlled, having flatus. Patient likely will be discharged home today.      Vital Signs Last 24 Hrs  T(C): 36.6 (17 May 2022 23:28), Max: 37.2 (17 May 2022 13:10)  T(F): 97.9 (17 May 2022 23:28), Max: 98.9 (17 May 2022 13:10)  HR: 87 (17 May 2022 23:28) (59 - 87)  BP: 119/77 (17 May 2022 23:28) (97/60 - 155/57)  BP(mean): 77 (17 May 2022 15:00) (72 - 83)  RR: 18 (17 May 2022 23:28) (11 - 20)  SpO2: 93% (17 May 2022 23:28) (90% - 98%)  Drug Dosing Weight  Height (cm): 165.1 (17 May 2022 09:28)  Weight (kg): 59 (17 May 2022 09:28)  BMI (kg/m2): 21.6 (17 May 2022 09:28)  BSA (m2): 1.65 (17 May 2022 09:28)  I&O's Detail    17 May 2022 07:01  -  18 May 2022 00:12  --------------------------------------------------------  IN:  Total IN: 0 mL    OUT:    Voided (mL): 900 mL  Total OUT: 900 mL    Total NET: -900 mL        Allergies    No Known Allergies    Intolerances                              13.5   7.80  )-----------( 134      ( 17 May 2022 05:38 )             40.6   05-17    141  |  106  |  8.2  ----------------------------<  88  3.5   |  22.0  |  0.60    Ca    8.5<L>      17 May 2022 05:38  Phos  3.3     05-17  Mg     2.0     05-17    TPro  5.8<L>  /  Alb  3.5  /  TBili  0.6  /  DBili  0.1  /  AST  23  /  ALT  25  /  AlkPhos  67  05-17  PT/INR - ( 17 May 2022 05:38 )   PT: 12.2 sec;   INR: 1.05 ratio         PTT - ( 17 May 2022 05:38 )  PTT:28.9 sec    ROS:    PHYSICAL EXAM:  Constitutional: in good spirits   Respiratory: no respiratory distress, no dyspnea, no supplemental o2 needed  Gastrointestinal: abdomen softly distended, appropriate post-op ttp, no guarding, no peritonitis, + flatus  Genitourinary: voiding   Neurological: A&OX3  Skin: warm, dry and no rashes           MEDICATIONS  (STANDING):  acetaminophen     Tablet .. 650 milliGRAM(s) Oral every 6 hours  atorvastatin 20 milliGRAM(s) Oral at bedtime  enoxaparin Injectable 40 milliGRAM(s) SubCutaneous every 24 hours    MEDICATIONS  (PRN):  oxyCODONE    IR 5 milliGRAM(s) Oral every 6 hours PRN Severe Pain (7 - 10)  traMADol 50 milliGRAM(s) Oral every 6 hours PRN Moderate Pain (4 - 6)      RADIOLOGY STUDIES:    CULTURES:

## 2022-05-19 PROBLEM — E78.00 PURE HYPERCHOLESTEROLEMIA, UNSPECIFIED: Chronic | Status: ACTIVE | Noted: 2022-05-16

## 2022-05-23 LAB — SURGICAL PATHOLOGY STUDY: SIGNIFICANT CHANGE UP

## 2022-05-31 ENCOUNTER — APPOINTMENT (OUTPATIENT)
Dept: TRAUMA SURGERY | Facility: CLINIC | Age: 72
End: 2022-05-31
Payer: MEDICARE

## 2022-05-31 VITALS
HEIGHT: 60.5 IN | HEART RATE: 66 BPM | TEMPERATURE: 98 F | OXYGEN SATURATION: 96 % | BODY MASS INDEX: 25.63 KG/M2 | DIASTOLIC BLOOD PRESSURE: 71 MMHG | RESPIRATION RATE: 16 BRPM | WEIGHT: 134 LBS | SYSTOLIC BLOOD PRESSURE: 110 MMHG

## 2022-05-31 DIAGNOSIS — K81.2 ACUTE CHOLECYSTITIS WITH CHRONIC CHOLECYSTITIS: ICD-10-CM

## 2022-05-31 PROCEDURE — 99024 POSTOP FOLLOW-UP VISIT: CPT

## 2022-06-01 NOTE — ED STATDOCS - CHPI ED CONTEXT
Unknown pt presents with c/o left earring stuck in her ear with redness, warmth, and swelling. earring removed. abx and tetanus given. will d/c.

## 2022-06-03 PROBLEM — K81.2 ACUTE CHOLECYSTITIS WITH CHRONIC CHOLECYSTITIS: Status: ACTIVE | Noted: 2022-06-03

## 2022-06-03 NOTE — ASSESSMENT
[FreeTextEntry1] : RTC prn \par Diet modification if  some  food groups  cause  some  GI upset\par Discussion with patient   All questions answered  Any acute symptoms and or concerns patient understands  to call back office  and  or  go  directly to [facility] ED\par \par

## 2022-06-03 NOTE — PHYSICAL EXAM
[Normal Breath Sounds] : Normal breath sounds [Normal Heart Sounds] : normal heart sounds [Abdomen Tenderness] : ~T ~M No abdominal tenderness [No Rash or Lesion] : No rash or lesion [Purpura] : no purpura  [Petechiae] : no petechiae [Skin Ulcer] : no ulcer [Skin Induration] : no induration [Alert] : alert [Oriented to Person] : oriented to person [Oriented to Place] : oriented to place [Oriented to Time] : oriented to time [Calm] : calm [de-identified] : wdwn  female  in  nad [de-identified] : non icteric sclera PERRLA EOMS intact  and  nl [de-identified] : trachea  midline [de-identified] : soft  non  tender  no distension  of  abdomen  all incision sites c/d/i  no  signs of  cellulitis  no  formation of  seroma no guarding  no rebound

## 2022-06-03 NOTE — HISTORY OF PRESENT ILLNESS
[FreeTextEntry1] : Patient presents  to ACS  clinic for  post op exam  s/p laparoscopic cholecystectomy   Patient  at  time  of  this e xam  denies  any  fevers no chills  no  n/v/d nl bm nl urination  nl  appetitie

## 2022-06-20 ENCOUNTER — NON-APPOINTMENT (OUTPATIENT)
Age: 72
End: 2022-06-20

## 2022-07-26 ENCOUNTER — OUTPATIENT (OUTPATIENT)
Dept: OUTPATIENT SERVICES | Facility: HOSPITAL | Age: 72
LOS: 1 days | End: 2022-07-26
Payer: MEDICARE

## 2022-07-26 ENCOUNTER — APPOINTMENT (OUTPATIENT)
Dept: MAMMOGRAPHY | Facility: CLINIC | Age: 72
End: 2022-07-26

## 2022-07-26 DIAGNOSIS — Z90.721 ACQUIRED ABSENCE OF OVARIES, UNILATERAL: Chronic | ICD-10-CM

## 2022-07-26 DIAGNOSIS — Z98.51 TUBAL LIGATION STATUS: Chronic | ICD-10-CM

## 2022-07-26 DIAGNOSIS — Z00.8 ENCOUNTER FOR OTHER GENERAL EXAMINATION: ICD-10-CM

## 2022-07-26 PROCEDURE — 77067 SCR MAMMO BI INCL CAD: CPT | Mod: 26

## 2022-07-26 PROCEDURE — 77067 SCR MAMMO BI INCL CAD: CPT

## 2022-07-26 PROCEDURE — 77063 BREAST TOMOSYNTHESIS BI: CPT

## 2022-07-26 PROCEDURE — 77063 BREAST TOMOSYNTHESIS BI: CPT | Mod: 26

## 2022-08-15 NOTE — PATIENT PROFILE ADULT - INTERNATIONAL TRAVEL
[To Whom it May Concern:] : To Whom it May Concern: [FreeTextEntry1] : Mr. 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  His surgical site is necrosing and may require additional surgery to manage.  He is currently unable to bear weight on his feet as he has an ulcer on his left foot as well.  I have advised him not to travel.  If you have any questions or concerns do not hesitate to contact me.   [FreeTextEntry3] : Gavin Lott, [place], ACFAS No

## 2022-08-15 NOTE — PATIENT PROFILE ADULT - NUMBER OF YRS
[FreeTextEntry1] : 17 y/o G 0 female, LMP: amenorrhea 2/2 ocp, presents for annual GYN visit.  Was skipping placebo week bc didn’t know instructions were to be pill free x 1 week.  last seen 8/5 and was treated for UTI and had STI testing: all neg. Patient denies any GYN complaints. \par \par Menstrual Cycle: amenorrhea 2/2 ocp\par Sexual Activity: monogamous with BF . \par Contraception: OCP and condoms\par Social/Mental Health: reports social ETOH, denies tobacco or any illicit drug use.  h/o anxiety and depression: following therapist and psychiatrist and Lexapro.  Denies any thoughts of personal harm or suicidal ideation.\par \par ROS:  Denies fever/chills, HA, Cough/sore throat, CP, SOB, N/V, Diarrhea/Constipation, Pelvic pain,\par Urinary frequency/urgency/incontinence, irregular vaginal bleeding, discharge or irritation.  \par \par Medical History\par GYN HX: denies\par PMH: obesity\par PSH: denies\par Meds:OCP, lexapro\par Allergies: NKDA\par  20

## 2022-08-24 ENCOUNTER — RESULT REVIEW (OUTPATIENT)
Age: 72
End: 2022-08-24

## 2023-03-07 ENCOUNTER — APPOINTMENT (OUTPATIENT)
Dept: GASTROENTEROLOGY | Facility: CLINIC | Age: 73
End: 2023-03-07
Payer: MEDICARE

## 2023-03-07 VITALS
HEART RATE: 81 BPM | OXYGEN SATURATION: 98 % | HEIGHT: 60 IN | DIASTOLIC BLOOD PRESSURE: 65 MMHG | RESPIRATION RATE: 14 BRPM | BODY MASS INDEX: 25.52 KG/M2 | SYSTOLIC BLOOD PRESSURE: 153 MMHG | TEMPERATURE: 97.2 F | WEIGHT: 130 LBS

## 2023-03-07 DIAGNOSIS — Z13.9 ENCOUNTER FOR SCREENING, UNSPECIFIED: ICD-10-CM

## 2023-03-07 DIAGNOSIS — F17.210 NICOTINE DEPENDENCE, CIGARETTES, UNCOMPLICATED: ICD-10-CM

## 2023-03-07 PROCEDURE — 99203 OFFICE O/P NEW LOW 30 MIN: CPT

## 2023-03-07 RX ORDER — SODIUM SULFATE, POTASSIUM SULFATE AND MAGNESIUM SULFATE 1.6; 3.13; 17.5 G/177ML; G/177ML; G/177ML
17.5-3.13-1.6 SOLUTION ORAL
Qty: 1 | Refills: 0 | Status: ACTIVE | COMMUNITY
Start: 2023-03-07 | End: 1900-01-01

## 2023-03-07 RX ORDER — POLYETHYLENE GLYCOL 3350, SODIUM SULFATE, SODIUM CHLORIDE, POTASSIUM CHLORIDE, ASCORBIC ACID, SODIUM ASCORBATE 7.5-2.691G
100 KIT ORAL
Qty: 1 | Refills: 0 | Status: DISCONTINUED | COMMUNITY
Start: 2017-06-30 | End: 2023-03-07

## 2023-03-07 NOTE — PHYSICAL EXAM

## 2023-03-08 NOTE — REASON FOR VISIT
[Initial Evaluation] : an initial evaluation [Spouse] : spouse [FreeTextEntry1] : colon cancer screening, history of colon polyps

## 2023-03-08 NOTE — ASSESSMENT
[FreeTextEntry1] : 72 year old female with history of colon polyps presenting for colon cancer surveillance. She will be scheduled for a colonoscopy with Suprep. I have discussed the indications (including but not limited to ruling out inflammatory bowel disease, colorectal neoplasm, GI bleed, and AVM's), benefits, risks  (including but not limited to reaction to the anesthesia, infection, bleeding, missed lesions, and perforation),  and alternatives to colonoscopy with the patient. The patient understands all options and has agreed to have a colonoscopy and is medically optimized for the planned procedure. \par \par Labs prior to procedure\par \par GI attending: History, physical, assessment, plan as outlined above by MW, NP, has been reviewed and verified.\par 72-year-old white female with history of colon polyps and diverticulosis.  Asymptomatic.  Last screening colonoscopy was in 8/17 prior colonoscopy in 2013 identified hyperplastic polyps.  Really asymptomatic.  Recent cholecystectomy 5/22 performed emergently at [facility] for acute cholecystitis.\par Currently asymptomatic.  Physical exam unrevealing.  Appropriate candidate for screening colonoscopy.  Arrangements made.
Clothing

## 2023-03-08 NOTE — ADDENDUM
[FreeTextEntry1] : I, Pricilla Hernandez NP, acted as scribe for Dr. Igor Dia for this patient encounter

## 2023-03-12 ENCOUNTER — NON-APPOINTMENT (OUTPATIENT)
Age: 73
End: 2023-03-12

## 2023-03-15 ENCOUNTER — TRANSCRIPTION ENCOUNTER (OUTPATIENT)
Age: 73
End: 2023-03-15

## 2023-03-16 ENCOUNTER — OUTPATIENT (OUTPATIENT)
Dept: OUTPATIENT SERVICES | Facility: HOSPITAL | Age: 73
LOS: 1 days | End: 2023-03-16
Payer: MEDICARE

## 2023-03-16 ENCOUNTER — APPOINTMENT (OUTPATIENT)
Dept: GASTROENTEROLOGY | Facility: GI CENTER | Age: 73
End: 2023-03-16
Payer: MEDICARE

## 2023-03-16 ENCOUNTER — RESULT REVIEW (OUTPATIENT)
Age: 73
End: 2023-03-16

## 2023-03-16 DIAGNOSIS — Z12.11 ENCOUNTER FOR SCREENING FOR MALIGNANT NEOPLASM OF COLON: ICD-10-CM

## 2023-03-16 DIAGNOSIS — Z98.51 TUBAL LIGATION STATUS: Chronic | ICD-10-CM

## 2023-03-16 DIAGNOSIS — Z80.0 FAMILY HISTORY OF MALIGNANT NEOPLASM OF DIGESTIVE ORGANS: ICD-10-CM

## 2023-03-16 DIAGNOSIS — Z86.010 PERSONAL HISTORY OF COLONIC POLYPS: ICD-10-CM

## 2023-03-16 DIAGNOSIS — K57.30 DIVERTICULOSIS OF LARGE INTESTINE W/OUT PERFORATION OR ABSCESS W/OUT BLEEDING: ICD-10-CM

## 2023-03-16 DIAGNOSIS — Z90.721 ACQUIRED ABSENCE OF OVARIES, UNILATERAL: Chronic | ICD-10-CM

## 2023-03-16 DIAGNOSIS — K63.5 POLYP OF COLON: ICD-10-CM

## 2023-03-16 DIAGNOSIS — Z90.49 ACQUIRED ABSENCE OF OTHER SPECIFIED PARTS OF DIGESTIVE TRACT: ICD-10-CM

## 2023-03-16 PROCEDURE — 88305 TISSUE EXAM BY PATHOLOGIST: CPT

## 2023-03-16 PROCEDURE — 45385 COLONOSCOPY W/LESION REMOVAL: CPT

## 2023-03-16 PROCEDURE — 88305 TISSUE EXAM BY PATHOLOGIST: CPT | Mod: 26

## 2023-03-16 PROCEDURE — 45385 COLONOSCOPY W/LESION REMOVAL: CPT | Mod: PT

## 2023-03-16 NOTE — PHYSICAL EXAM
[Alert] : alert [Normal Voice/Communication] : normal voice/communication [Healthy Appearing] : healthy appearing [No Acute Distress] : no acute distress [Sclera] : the sclera and conjunctiva were normal [Hearing Threshold Finger Rub Not [place]] : hearing was normal [Normal Lips/Gums] : the lips and gums were normal [Oropharynx] : the oropharynx was normal [Normal Appearance] : the appearance of the neck was normal [No Neck Mass] : no neck mass was observed [No Respiratory Distress] : no respiratory distress [No Acc Muscle Use] : no accessory muscle use [Respiration, Rhythm And Depth] : normal respiratory rhythm and effort [Auscultation Breath Sounds / Voice Sounds] : lungs were clear to auscultation bilaterally [Heart Rate And Rhythm] : heart rate was normal and rhythm regular [Normal S1, S2] : normal S1 and S2 [Murmurs] : no murmurs [Bowel Sounds] : normal bowel sounds [Abdomen Tenderness] : non-tender [No Masses] : no abdominal mass palpated [Abdomen Soft] : soft [] : no hepatosplenomegaly [Normal Sphincter Tone] : normal sphincter tone [No External Hemorrhoid] : no external hemorrhoids [No Rectal Mass] : no rectal mass [Oriented To Time, Place, And Person] : oriented to person, place, and time [de-identified] : Soft benign bowel sounds present.  No organomegaly.  No mass tenderness or rebound. [de-identified] : Empty rectal vault.  No lesions.

## 2023-03-16 NOTE — ASSESSMENT
[FreeTextEntry1] : Appropriate candidate for polyp surveillance colonoscopy.  ASA #2.  Optimized.  Consented.

## 2023-03-20 LAB — SURGICAL PATHOLOGY STUDY: SIGNIFICANT CHANGE UP

## 2023-04-07 ENCOUNTER — NON-APPOINTMENT (OUTPATIENT)
Age: 73
End: 2023-04-07

## 2023-08-10 ENCOUNTER — APPOINTMENT (OUTPATIENT)
Dept: ULTRASOUND IMAGING | Facility: CLINIC | Age: 73
End: 2023-08-10

## 2023-08-10 ENCOUNTER — OUTPATIENT (OUTPATIENT)
Dept: OUTPATIENT SERVICES | Facility: HOSPITAL | Age: 73
LOS: 1 days | End: 2023-08-10
Payer: MEDICARE

## 2023-08-10 ENCOUNTER — APPOINTMENT (OUTPATIENT)
Dept: MAMMOGRAPHY | Facility: CLINIC | Age: 73
End: 2023-08-10

## 2023-08-10 DIAGNOSIS — Z12.39 ENCOUNTER FOR OTHER SCREENING FOR MALIGNANT NEOPLASM OF BREAST: ICD-10-CM

## 2023-08-10 DIAGNOSIS — Z90.721 ACQUIRED ABSENCE OF OVARIES, UNILATERAL: Chronic | ICD-10-CM

## 2023-08-10 DIAGNOSIS — Z12.31 ENCOUNTER FOR SCREENING MAMMOGRAM FOR MALIGNANT NEOPLASM OF BREAST: ICD-10-CM

## 2023-08-10 DIAGNOSIS — R92.2 INCONCLUSIVE MAMMOGRAM: ICD-10-CM

## 2023-08-10 DIAGNOSIS — Z98.51 TUBAL LIGATION STATUS: Chronic | ICD-10-CM

## 2023-08-10 PROCEDURE — 77063 BREAST TOMOSYNTHESIS BI: CPT

## 2023-08-10 PROCEDURE — 77063 BREAST TOMOSYNTHESIS BI: CPT | Mod: 26

## 2023-08-10 PROCEDURE — 77067 SCR MAMMO BI INCL CAD: CPT

## 2023-08-10 PROCEDURE — 76641 ULTRASOUND BREAST COMPLETE: CPT | Mod: 26,50

## 2023-08-10 PROCEDURE — 76641 ULTRASOUND BREAST COMPLETE: CPT

## 2023-08-10 PROCEDURE — 77067 SCR MAMMO BI INCL CAD: CPT | Mod: 26

## 2023-09-06 ENCOUNTER — NON-APPOINTMENT (OUTPATIENT)
Age: 73
End: 2023-09-06

## 2023-09-07 ENCOUNTER — APPOINTMENT (OUTPATIENT)
Dept: DERMATOLOGY | Facility: CLINIC | Age: 73
End: 2023-09-07
Payer: MEDICARE

## 2023-09-07 PROCEDURE — 11102 TANGNTL BX SKIN SINGLE LES: CPT

## 2023-09-07 PROCEDURE — 99213 OFFICE O/P EST LOW 20 MIN: CPT | Mod: 25

## 2023-09-15 ENCOUNTER — NON-APPOINTMENT (OUTPATIENT)
Age: 73
End: 2023-09-15

## 2023-09-20 ENCOUNTER — APPOINTMENT (OUTPATIENT)
Dept: RADIOLOGY | Facility: CLINIC | Age: 73
End: 2023-09-20
Payer: MEDICARE

## 2023-09-20 ENCOUNTER — OUTPATIENT (OUTPATIENT)
Dept: OUTPATIENT SERVICES | Facility: HOSPITAL | Age: 73
LOS: 1 days | End: 2023-09-20
Payer: MEDICARE

## 2023-09-20 DIAGNOSIS — Z98.51 TUBAL LIGATION STATUS: Chronic | ICD-10-CM

## 2023-09-20 DIAGNOSIS — Z90.721 ACQUIRED ABSENCE OF OVARIES, UNILATERAL: Chronic | ICD-10-CM

## 2023-09-20 DIAGNOSIS — Z13.820 ENCOUNTER FOR SCREENING FOR OSTEOPOROSIS: ICD-10-CM

## 2023-09-20 PROCEDURE — 77080 DXA BONE DENSITY AXIAL: CPT

## 2023-09-20 PROCEDURE — 77080 DXA BONE DENSITY AXIAL: CPT | Mod: 26

## 2023-10-18 ENCOUNTER — APPOINTMENT (OUTPATIENT)
Dept: DERMATOLOGY | Facility: CLINIC | Age: 73
End: 2023-10-18
Payer: MEDICARE

## 2023-10-18 PROCEDURE — 12032 INTMD RPR S/A/T/EXT 2.6-7.5: CPT

## 2023-10-18 PROCEDURE — 17313 MOHS 1 STAGE T/A/L: CPT

## 2023-10-19 RX ORDER — MUPIROCIN 20 MG/G
2 OINTMENT TOPICAL TWICE DAILY
Qty: 1 | Refills: 6 | Status: ACTIVE | COMMUNITY
Start: 2023-10-19 | End: 1900-01-01

## 2023-10-20 ENCOUNTER — APPOINTMENT (OUTPATIENT)
Dept: DERMATOLOGY | Facility: CLINIC | Age: 73
End: 2023-10-20

## 2023-10-20 ENCOUNTER — INPATIENT (INPATIENT)
Facility: HOSPITAL | Age: 73
LOS: 2 days | Discharge: HOME | DRG: 863 | End: 2023-10-23
Attending: HOSPITALIST | Admitting: HOSPITALIST
Payer: MEDICARE

## 2023-10-20 VITALS
HEART RATE: 80 BPM | OXYGEN SATURATION: 99 % | RESPIRATION RATE: 16 BRPM | SYSTOLIC BLOOD PRESSURE: 158 MMHG | DIASTOLIC BLOOD PRESSURE: 72 MMHG | TEMPERATURE: 98 F

## 2023-10-20 DIAGNOSIS — Z98.51 TUBAL LIGATION STATUS: Chronic | ICD-10-CM

## 2023-10-20 DIAGNOSIS — Z98.890 OTHER SPECIFIED POSTPROCEDURAL STATES: Chronic | ICD-10-CM

## 2023-10-20 DIAGNOSIS — Z90.721 ACQUIRED ABSENCE OF OVARIES, UNILATERAL: Chronic | ICD-10-CM

## 2023-10-20 LAB
ALBUMIN SERPL ELPH-MCNC: 4.6 G/DL — SIGNIFICANT CHANGE UP (ref 3.3–5.2)
ALBUMIN SERPL ELPH-MCNC: 4.6 G/DL — SIGNIFICANT CHANGE UP (ref 3.3–5.2)
ALP SERPL-CCNC: 80 U/L — SIGNIFICANT CHANGE UP (ref 40–120)
ALP SERPL-CCNC: 80 U/L — SIGNIFICANT CHANGE UP (ref 40–120)
ALT FLD-CCNC: 28 U/L — SIGNIFICANT CHANGE UP
ALT FLD-CCNC: 28 U/L — SIGNIFICANT CHANGE UP
ANION GAP SERPL CALC-SCNC: 15 MMOL/L — SIGNIFICANT CHANGE UP (ref 5–17)
ANION GAP SERPL CALC-SCNC: 15 MMOL/L — SIGNIFICANT CHANGE UP (ref 5–17)
APTT BLD: 30 SEC — SIGNIFICANT CHANGE UP (ref 24.5–35.6)
APTT BLD: 30 SEC — SIGNIFICANT CHANGE UP (ref 24.5–35.6)
AST SERPL-CCNC: 26 U/L — SIGNIFICANT CHANGE UP
AST SERPL-CCNC: 26 U/L — SIGNIFICANT CHANGE UP
BASOPHILS # BLD AUTO: 0.05 K/UL — SIGNIFICANT CHANGE UP (ref 0–0.2)
BASOPHILS # BLD AUTO: 0.05 K/UL — SIGNIFICANT CHANGE UP (ref 0–0.2)
BASOPHILS NFR BLD AUTO: 0.5 % — SIGNIFICANT CHANGE UP (ref 0–2)
BASOPHILS NFR BLD AUTO: 0.5 % — SIGNIFICANT CHANGE UP (ref 0–2)
BILIRUB SERPL-MCNC: 0.4 MG/DL — SIGNIFICANT CHANGE UP (ref 0.4–2)
BILIRUB SERPL-MCNC: 0.4 MG/DL — SIGNIFICANT CHANGE UP (ref 0.4–2)
BUN SERPL-MCNC: 9.5 MG/DL — SIGNIFICANT CHANGE UP (ref 8–20)
BUN SERPL-MCNC: 9.5 MG/DL — SIGNIFICANT CHANGE UP (ref 8–20)
CALCIUM SERPL-MCNC: 9.6 MG/DL — SIGNIFICANT CHANGE UP (ref 8.4–10.5)
CALCIUM SERPL-MCNC: 9.6 MG/DL — SIGNIFICANT CHANGE UP (ref 8.4–10.5)
CHLORIDE SERPL-SCNC: 106 MMOL/L — SIGNIFICANT CHANGE UP (ref 96–108)
CHLORIDE SERPL-SCNC: 106 MMOL/L — SIGNIFICANT CHANGE UP (ref 96–108)
CO2 SERPL-SCNC: 20 MMOL/L — LOW (ref 22–29)
CO2 SERPL-SCNC: 20 MMOL/L — LOW (ref 22–29)
CREAT SERPL-MCNC: 0.64 MG/DL — SIGNIFICANT CHANGE UP (ref 0.5–1.3)
CREAT SERPL-MCNC: 0.64 MG/DL — SIGNIFICANT CHANGE UP (ref 0.5–1.3)
EGFR: 92 ML/MIN/1.73M2 — SIGNIFICANT CHANGE UP
EGFR: 92 ML/MIN/1.73M2 — SIGNIFICANT CHANGE UP
EOSINOPHIL # BLD AUTO: 0.52 K/UL — HIGH (ref 0–0.5)
EOSINOPHIL # BLD AUTO: 0.52 K/UL — HIGH (ref 0–0.5)
EOSINOPHIL NFR BLD AUTO: 4.7 % — SIGNIFICANT CHANGE UP (ref 0–6)
EOSINOPHIL NFR BLD AUTO: 4.7 % — SIGNIFICANT CHANGE UP (ref 0–6)
GLUCOSE SERPL-MCNC: 104 MG/DL — HIGH (ref 70–99)
GLUCOSE SERPL-MCNC: 104 MG/DL — HIGH (ref 70–99)
HCT VFR BLD CALC: 48.5 % — HIGH (ref 34.5–45)
HCT VFR BLD CALC: 48.5 % — HIGH (ref 34.5–45)
HGB BLD-MCNC: 16.6 G/DL — HIGH (ref 11.5–15.5)
HGB BLD-MCNC: 16.6 G/DL — HIGH (ref 11.5–15.5)
IMM GRANULOCYTES NFR BLD AUTO: 0.5 % — SIGNIFICANT CHANGE UP (ref 0–0.9)
IMM GRANULOCYTES NFR BLD AUTO: 0.5 % — SIGNIFICANT CHANGE UP (ref 0–0.9)
INR BLD: 0.91 RATIO — SIGNIFICANT CHANGE UP (ref 0.85–1.18)
INR BLD: 0.91 RATIO — SIGNIFICANT CHANGE UP (ref 0.85–1.18)
LACTATE BLDV-MCNC: 1.2 MMOL/L — SIGNIFICANT CHANGE UP (ref 0.5–2)
LACTATE BLDV-MCNC: 1.2 MMOL/L — SIGNIFICANT CHANGE UP (ref 0.5–2)
LYMPHOCYTES # BLD AUTO: 2.21 K/UL — SIGNIFICANT CHANGE UP (ref 1–3.3)
LYMPHOCYTES # BLD AUTO: 2.21 K/UL — SIGNIFICANT CHANGE UP (ref 1–3.3)
LYMPHOCYTES # BLD AUTO: 20.1 % — SIGNIFICANT CHANGE UP (ref 13–44)
LYMPHOCYTES # BLD AUTO: 20.1 % — SIGNIFICANT CHANGE UP (ref 13–44)
MCHC RBC-ENTMCNC: 31.1 PG — SIGNIFICANT CHANGE UP (ref 27–34)
MCHC RBC-ENTMCNC: 31.1 PG — SIGNIFICANT CHANGE UP (ref 27–34)
MCHC RBC-ENTMCNC: 34.2 GM/DL — SIGNIFICANT CHANGE UP (ref 32–36)
MCHC RBC-ENTMCNC: 34.2 GM/DL — SIGNIFICANT CHANGE UP (ref 32–36)
MCV RBC AUTO: 90.8 FL — SIGNIFICANT CHANGE UP (ref 80–100)
MCV RBC AUTO: 90.8 FL — SIGNIFICANT CHANGE UP (ref 80–100)
MONOCYTES # BLD AUTO: 0.66 K/UL — SIGNIFICANT CHANGE UP (ref 0–0.9)
MONOCYTES # BLD AUTO: 0.66 K/UL — SIGNIFICANT CHANGE UP (ref 0–0.9)
MONOCYTES NFR BLD AUTO: 6 % — SIGNIFICANT CHANGE UP (ref 2–14)
MONOCYTES NFR BLD AUTO: 6 % — SIGNIFICANT CHANGE UP (ref 2–14)
NEUTROPHILS # BLD AUTO: 7.5 K/UL — HIGH (ref 1.8–7.4)
NEUTROPHILS # BLD AUTO: 7.5 K/UL — HIGH (ref 1.8–7.4)
NEUTROPHILS NFR BLD AUTO: 68.2 % — SIGNIFICANT CHANGE UP (ref 43–77)
NEUTROPHILS NFR BLD AUTO: 68.2 % — SIGNIFICANT CHANGE UP (ref 43–77)
PLATELET # BLD AUTO: 155 K/UL — SIGNIFICANT CHANGE UP (ref 150–400)
PLATELET # BLD AUTO: 155 K/UL — SIGNIFICANT CHANGE UP (ref 150–400)
POTASSIUM SERPL-MCNC: 4.3 MMOL/L — SIGNIFICANT CHANGE UP (ref 3.5–5.3)
POTASSIUM SERPL-MCNC: 4.3 MMOL/L — SIGNIFICANT CHANGE UP (ref 3.5–5.3)
POTASSIUM SERPL-SCNC: 4.3 MMOL/L — SIGNIFICANT CHANGE UP (ref 3.5–5.3)
POTASSIUM SERPL-SCNC: 4.3 MMOL/L — SIGNIFICANT CHANGE UP (ref 3.5–5.3)
PROT SERPL-MCNC: 7.4 G/DL — SIGNIFICANT CHANGE UP (ref 6.6–8.7)
PROT SERPL-MCNC: 7.4 G/DL — SIGNIFICANT CHANGE UP (ref 6.6–8.7)
PROTHROM AB SERPL-ACNC: 10.1 SEC — SIGNIFICANT CHANGE UP (ref 9.5–13)
PROTHROM AB SERPL-ACNC: 10.1 SEC — SIGNIFICANT CHANGE UP (ref 9.5–13)
RBC # BLD: 5.34 M/UL — HIGH (ref 3.8–5.2)
RBC # BLD: 5.34 M/UL — HIGH (ref 3.8–5.2)
RBC # FLD: 12.1 % — SIGNIFICANT CHANGE UP (ref 10.3–14.5)
RBC # FLD: 12.1 % — SIGNIFICANT CHANGE UP (ref 10.3–14.5)
SODIUM SERPL-SCNC: 141 MMOL/L — SIGNIFICANT CHANGE UP (ref 135–145)
SODIUM SERPL-SCNC: 141 MMOL/L — SIGNIFICANT CHANGE UP (ref 135–145)
WBC # BLD: 10.99 K/UL — HIGH (ref 3.8–10.5)
WBC # BLD: 10.99 K/UL — HIGH (ref 3.8–10.5)
WBC # FLD AUTO: 10.99 K/UL — HIGH (ref 3.8–10.5)
WBC # FLD AUTO: 10.99 K/UL — HIGH (ref 3.8–10.5)

## 2023-10-20 PROCEDURE — 73201 CT UPPER EXTREMITY W/DYE: CPT | Mod: 26,RT,MF

## 2023-10-20 PROCEDURE — G1004: CPT

## 2023-10-20 PROCEDURE — 99223 1ST HOSP IP/OBS HIGH 75: CPT

## 2023-10-20 RX ORDER — CEFAZOLIN SODIUM 1 G
2000 VIAL (EA) INJECTION EVERY 8 HOURS
Refills: 0 | Status: DISCONTINUED | OUTPATIENT
Start: 2023-10-20 | End: 2023-10-23

## 2023-10-20 RX ORDER — CEFAZOLIN SODIUM 1 G
2000 VIAL (EA) INJECTION ONCE
Refills: 0 | Status: COMPLETED | OUTPATIENT
Start: 2023-10-20 | End: 2023-10-20

## 2023-10-20 RX ORDER — KETOROLAC TROMETHAMINE 30 MG/ML
15 SYRINGE (ML) INJECTION ONCE
Refills: 0 | Status: DISCONTINUED | OUTPATIENT
Start: 2023-10-20 | End: 2023-10-20

## 2023-10-20 RX ORDER — KETOROLAC TROMETHAMINE 30 MG/ML
15 SYRINGE (ML) INJECTION EVERY 6 HOURS
Refills: 0 | Status: DISCONTINUED | OUTPATIENT
Start: 2023-10-20 | End: 2023-10-23

## 2023-10-20 RX ORDER — ATORVASTATIN CALCIUM 80 MG/1
20 TABLET, FILM COATED ORAL AT BEDTIME
Refills: 0 | Status: DISCONTINUED | OUTPATIENT
Start: 2023-10-20 | End: 2023-10-21

## 2023-10-20 RX ORDER — MORPHINE SULFATE 50 MG/1
4 CAPSULE, EXTENDED RELEASE ORAL ONCE
Refills: 0 | Status: DISCONTINUED | OUTPATIENT
Start: 2023-10-20 | End: 2023-10-20

## 2023-10-20 RX ORDER — CEFAZOLIN SODIUM 1 G
2000 VIAL (EA) INJECTION EVERY 8 HOURS
Refills: 0 | Status: DISCONTINUED | OUTPATIENT
Start: 2023-10-20 | End: 2023-10-20

## 2023-10-20 RX ORDER — SODIUM CHLORIDE 9 MG/ML
1000 INJECTION INTRAMUSCULAR; INTRAVENOUS; SUBCUTANEOUS ONCE
Refills: 0 | Status: COMPLETED | OUTPATIENT
Start: 2023-10-20 | End: 2023-10-20

## 2023-10-20 RX ADMIN — ATORVASTATIN CALCIUM 20 MILLIGRAM(S): 80 TABLET, FILM COATED ORAL at 22:16

## 2023-10-20 RX ADMIN — Medication 15 MILLIGRAM(S): at 17:11

## 2023-10-20 RX ADMIN — SODIUM CHLORIDE 1000 MILLILITER(S): 9 INJECTION INTRAMUSCULAR; INTRAVENOUS; SUBCUTANEOUS at 06:24

## 2023-10-20 RX ADMIN — MORPHINE SULFATE 4 MILLIGRAM(S): 50 CAPSULE, EXTENDED RELEASE ORAL at 11:14

## 2023-10-20 RX ADMIN — Medication 2000 MILLIGRAM(S): at 16:38

## 2023-10-20 RX ADMIN — MORPHINE SULFATE 4 MILLIGRAM(S): 50 CAPSULE, EXTENDED RELEASE ORAL at 07:21

## 2023-10-20 RX ADMIN — Medication 15 MILLIGRAM(S): at 23:30

## 2023-10-20 RX ADMIN — Medication 15 MILLIGRAM(S): at 05:53

## 2023-10-20 RX ADMIN — SODIUM CHLORIDE 1000 MILLILITER(S): 9 INJECTION INTRAMUSCULAR; INTRAVENOUS; SUBCUTANEOUS at 11:14

## 2023-10-20 RX ADMIN — Medication 2000 MILLIGRAM(S): at 05:53

## 2023-10-20 RX ADMIN — Medication 15 MILLIGRAM(S): at 16:41

## 2023-10-20 RX ADMIN — Medication 15 MILLIGRAM(S): at 22:34

## 2023-10-20 RX ADMIN — Medication 15 MILLIGRAM(S): at 06:15

## 2023-10-20 RX ADMIN — Medication 2000 MILLIGRAM(S): at 22:15

## 2023-10-20 NOTE — ED PROVIDER NOTE - CLINICAL SUMMARY MEDICAL DECISION MAKING FREE TEXT BOX
73y F presents for worsening pain/swelling to right arm after recent Mohs surgery. Stable VS. Concern of post-op infection. Will check labs, cultures, CT. Treat empirically with Ancef. Ortho/hand consulted. 73y F presents for worsening pain/swelling to right arm after recent Mohs surgery. Stable VS. Concern of post-op infection. Will check labs, cultures, CT. Treat empirically with Ancef. Ortho/hand consulted. Will attempt to contact pt's dermatologist.

## 2023-10-20 NOTE — ED CDU PROVIDER INITIAL DAY NOTE - CLINICAL SUMMARY MEDICAL DECISION MAKING FREE TEXT BOX
73y F w/ hx HLD, squamous cell carcinoma s/p recent Mohs surgery 2 days ago to R wrist/forearm; presents for arm/hand pain.    Cellulitis  - Ancef 2 g q 6 hours,  - mildly elevated wbc, repeat in am  - Elevate extremity  - toradol for pain control  - Tylenol for fever control  - Discussed with Dermatologist, Dr. Martínez who advised likely more edema than cellulitis, sent over information to Derm, she advised can treat pt for cellulitis but she will follow up with patient when discharged    HLD  - Continue on atrovastatin

## 2023-10-20 NOTE — ED CDU PROVIDER INITIAL DAY NOTE - ATTENDING APP SHARED VISIT CONTRIBUTION OF CARE
I agree with the PA's note and was available for any issues/concerns. I was directly involved in patient care. My brief overall assessment is as follows:    73 year old female PMHx SSC c/o hand swelling; initial work up reviewed; admit to obs for abx and reassessment

## 2023-10-20 NOTE — ED CDU PROVIDER INITIAL DAY NOTE - PHYSICAL EXAMINATION
Constitutional: Awake, alert, in no acute distress  Eyes: no scleral icterus  HENT: normocephalic, atraumatic, moist oral mucosa  Neck: supple  CV: RRR, no murmur  Pulm: non-labored respirations, CTAB  Abdomen: soft, non-tender, non-distended  Extremities: Surgical incision to right dorsal forearm/wrist, +mild serosanguinous drainage, no purulence, +overlying blisters, +mild surrounding erythema, +edema of right arm/hand distally, unable to fully extend fingers. Sensation intact. No streaking or lymphadenopathy.  Skin: no rash, no jaundice  Neuro: AAOx3, moving all extremities

## 2023-10-20 NOTE — ED ADULT TRIAGE NOTE - CHIEF COMPLAINT QUOTE
right hand swelling status post MOHS procedure on Wednesday, reports swelling is worsening, moving all fingers, sensation intact

## 2023-10-20 NOTE — ED PROVIDER NOTE - OBJECTIVE STATEMENT
73y F w/ hx HLD, squamous cell carcinoma s/p recent Mohs surgery 2 days ago to R wrist/forearm; presents for arm/hand pain. Surgery was performed by Dr. Michelle. Pt reports progressively worsening swelling around the wound since the surgery. Called her dermatologist but was not able to be seen so came to the ED. No fever or chills.

## 2023-10-20 NOTE — ED PROVIDER NOTE - NS ED ROS FT
Constitutional: no fever  CV: no chest pain  Resp: no cough, no shortness of breath  GI: no abdominal pain, no vomiting, no diarrhea  : no dysuria  MSK: +joint pain  Neuro: no headache

## 2023-10-20 NOTE — ED ADULT NURSE NOTE - OBJECTIVE STATEMENT
Pt A & Ox4 s/p MOHS on 2 days ago on rigth wrist. Pt c/o increased swelling, pain, drainage over the last day. Respirations even and unlabored. No edema, JVD, diaphoresis noted. Denies chest pain or SOB. Pt denies fevers, chills. Full sensation in hand and wrist, pt cannot flex or extend right wrist d/t pain and swelling. IV established, blood cultures and blood specimens sent to lab. IVABX to be administered. Hand surgery consulted.

## 2023-10-20 NOTE — CONSULT NOTE ADULT - SUBJECTIVE AND OBJECTIVE BOX
Pt Name: ALEX MAYFIELD  MRN: [id number]      Patient is a 73y Female presenting to the emergency department with a chief complaint of right wrist pain/swelling since yesterday. Patient had mohs procedure performed by Dr. Martínez (dermatology). Reports that swelling/pain increased throughout day and was suppose to follow up in office today but decided to come to ER instead. Patient reports pain at wrist and hand. Denies acute sensory/motor changes. No other ortho complaints at this time. Denies fever     REVIEW OF SYSTEMS  General: Alert, responsive, in NAD  Skin/Breast: No rashes, no pruritis   Ophthalmologic: No visual changes. No redness.   ENMT:	No discharge. No swelling.  Respiratory and Thorax: No difficulty breathing. No cough.	   Cardiovascular:	No chest pain. No palpitations.  Gastrointestinal:	 No abdominal pain. No diarrhea.   Genitourinary: No dysuria. No bleeding.  Musculoskeletal: SEE HPI.  Neurological: No sensory or motor changes.   ROS is otherwise negative.    PAST MEDICAL & SURGICAL HISTORY:  PAST MEDICAL & SURGICAL HISTORY:  Hypercholesterolemia  H/O tubal ligation  History of right salpingo-oophorectomy  2/2 ectopic pregnancy    Allergies: adhesives (Rash)  No Known Drug Allergies    Medications: ceFAZolin  Injectable. 2000 milliGRAM(s) IV Push once  ketorolac   Injectable 15 milliGRAM(s) IV Push Once    FAMILY HISTORY:  : non-contributory    Social History:     Vital Signs Last 24 Hrs  T(C): 36.6 (20 Oct 2023 04:48), Max: 36.6 (20 Oct 2023 04:48)  T(F): 97.8 (20 Oct 2023 04:48), Max: 97.8 (20 Oct 2023 04:48)  HR: 80 (20 Oct 2023 04:48) (80 - 80)  BP: 158/72 (20 Oct 2023 04:48) (158/72 - 158/72)  BP(mean): --  RR: 16 (20 Oct 2023 04:48) (16 - 16)  SpO2: 99% (20 Oct 2023 04:48) (99% - 99%)    Parameters below as of 20 Oct 2023 04:48  Patient On (Oxygen Delivery Method): room air    Daily     Daily     PHYSICAL EXAM:  Appearance: Alert, responsive, in no acute distress.    RUE:  Skin: Incision noted at dorsum of distal forearm, proximal to wrist. No active drainage/bleeding from incision. Suture noted. Mild erythema at dorsum of wrist. periwound Blisters noted. Swelling noted of wrist  and hand.  Compartments soft/compressible.   Neurological: Sensation is grossly intact to light touch.   Motor: Limited ROM of wrist 2/2 pain, Limited ROM fingers 2/2 pain.   Vascular: 2+ distal pulses. Cap refill < 2 sec. No signs of venous insufficiency or stasis. No extremity ulcerations. No cyanosis.    Imaging Studies:    A/P:  Pt is a 73y Female with right hand/wrist swelling s/p mohs procedure wednesday with dr martínez    PLAN:   * Recommend ER communicating with primary surgeon for further post operative recommendations  * IV abx  * Elevation of RUE   Pt Name: ALEX MAYFIELD  MRN: [id number]      Patient is a 73y Female presenting to the emergency department with a chief complaint of right wrist pain/swelling since yesterday. Patient had mohs procedure performed by Dr. Martínez (dermatology). Reports that swelling/pain increased throughout day and was suppose to follow up in office today but decided to come to ER instead. Patient reports pain at wrist and hand. Denies acute sensory/motor changes. No other ortho complaints at this time. Denies fever     REVIEW OF SYSTEMS  General: Alert, responsive, in NAD  Skin/Breast: No rashes, no pruritis   Ophthalmologic: No visual changes. No redness.   ENMT:	No discharge. No swelling.  Respiratory and Thorax: No difficulty breathing. No cough.	   Cardiovascular:	No chest pain. No palpitations.  Gastrointestinal:	 No abdominal pain. No diarrhea.   Genitourinary: No dysuria. No bleeding.  Musculoskeletal: SEE HPI.  Neurological: No sensory or motor changes.   ROS is otherwise negative.    PAST MEDICAL & SURGICAL HISTORY:  PAST MEDICAL & SURGICAL HISTORY:  Hypercholesterolemia  H/O tubal ligation  History of right salpingo-oophorectomy  2/2 ectopic pregnancy    Allergies: adhesives (Rash)  No Known Drug Allergies    Medications: ceFAZolin  Injectable. 2000 milliGRAM(s) IV Push once  ketorolac   Injectable 15 milliGRAM(s) IV Push Once    FAMILY HISTORY:  : non-contributory    Social History:     Vital Signs Last 24 Hrs  T(C): 36.6 (20 Oct 2023 04:48), Max: 36.6 (20 Oct 2023 04:48)  T(F): 97.8 (20 Oct 2023 04:48), Max: 97.8 (20 Oct 2023 04:48)  HR: 80 (20 Oct 2023 04:48) (80 - 80)  BP: 158/72 (20 Oct 2023 04:48) (158/72 - 158/72)  BP(mean): --  RR: 16 (20 Oct 2023 04:48) (16 - 16)  SpO2: 99% (20 Oct 2023 04:48) (99% - 99%)    Parameters below as of 20 Oct 2023 04:48  Patient On (Oxygen Delivery Method): room air    Daily     Daily     PHYSICAL EXAM:  Appearance: Alert, responsive, in no acute distress.    RUE:  Skin: Incision noted at dorsum of distal forearm, proximal to wrist. No active drainage/bleeding from incision. Suture noted. Mild erythema at dorsum of wrist. periwound Blisters noted. Swelling noted of wrist  and hand.  Compartments soft/compressible.   Neurological: Sensation is grossly intact to light touch.   Motor: Limited ROM of wrist 2/2 pain, Limited ROM fingers 2/2 pain.   Vascular: 2+ distal pulses. Cap refill < 2 sec. No signs of venous insufficiency or stasis. No extremity ulcerations. No cyanosis.    Imaging Studies:    A/P:  Pt is a 73y Female with right hand/wrist swelling s/p mohs procedure wednesday with dr martínez    PLAN:   * IV abx  * Elevation of RUE  * Recommend ER communicating with primary surgeon for further post operative recommendations   * hand surgery signing off  * d/w Dr. Cosme

## 2023-10-20 NOTE — ED ADULT NURSE REASSESSMENT NOTE - NS ED NURSE REASSESS COMMENT FT1
Assumed care of patient at 11:45 from MELODY Albarran. Charting as noted. Patient A&Ox4, resp even/unlabored, MAEx4, skin warm, dry, intact, presents to ED c/o right arm pain post op, associated with swelling. At time of assessment, patient denies pain/discomfort, denies CP/SOB, denies any further complaints. Patient updated on the plan of care, awaiting further MD orders and continued IV abx. Stretcher locked in lowest position, IV site flushed w/ NS. No redness, swelling or pain noted to site. No signs of acute distress noted, safety maintained. Emotional support provided.

## 2023-10-20 NOTE — ED ADULT NURSE REASSESSMENT NOTE - NS ED NURSE REASSESS COMMENT FT1
Pt is resting in bed comfortably at this time, no apparent distress or change in mental status noted. Pt safety maintained. Pt denies any complaints. Pt updated on plan of care, all questions answered.

## 2023-10-20 NOTE — ED ADULT NURSE REASSESSMENT NOTE - NS ED NURSE REASSESS COMMENT FT1
Pt received A&O x's 3 c/o pain, swelling and drainage to right hand s/p MOHS surgery a few days ago. Pt with redness up into forearm. Denies any fevers at home.

## 2023-10-20 NOTE — ED PROVIDER NOTE - NSICDXPASTSURGICALHX_GEN_ALL_CORE_FT
PAST SURGICAL HISTORY:  H/O tubal ligation     History of right salpingo-oophorectomy 2/2 ectopic pregnancy    Status post Mohs surgery squamous cell carcinoma -- right arm

## 2023-10-20 NOTE — ED CDU PROVIDER INITIAL DAY NOTE - OBJECTIVE STATEMENT
73y F w/ hx HLD, squamous cell carcinoma s/p recent Mohs surgery 2 days ago to R wrist/forearm; presents for arm/hand pain. Surgery was performed by Dr. Michelle. Pt reports progressively worsening swelling around the wound since the surgery. Called her dermatologist but was not able to be seen so came to the ED. No fever or chills. pt with mildly elevated wbc, erythema at the wound site. Ct show edema consistent with cellulitis, no abscess

## 2023-10-20 NOTE — ED ADULT NURSE NOTE - NSFALLASSESSNEED_ED_ALL_ED
no posterior cervical R/anterior cervical R/supraclavicular R/supraclavicular L/posterior cervical L/anterior cervical L

## 2023-10-21 DIAGNOSIS — L03.119 CELLULITIS OF UNSPECIFIED PART OF LIMB: ICD-10-CM

## 2023-10-21 LAB
ALBUMIN SERPL ELPH-MCNC: 4.3 G/DL — SIGNIFICANT CHANGE UP (ref 3.3–5.2)
ALBUMIN SERPL ELPH-MCNC: 4.3 G/DL — SIGNIFICANT CHANGE UP (ref 3.3–5.2)
ALP SERPL-CCNC: 82 U/L — SIGNIFICANT CHANGE UP (ref 40–120)
ALP SERPL-CCNC: 82 U/L — SIGNIFICANT CHANGE UP (ref 40–120)
ALT FLD-CCNC: 27 U/L — SIGNIFICANT CHANGE UP
ALT FLD-CCNC: 27 U/L — SIGNIFICANT CHANGE UP
ANION GAP SERPL CALC-SCNC: 14 MMOL/L — SIGNIFICANT CHANGE UP (ref 5–17)
ANION GAP SERPL CALC-SCNC: 14 MMOL/L — SIGNIFICANT CHANGE UP (ref 5–17)
AST SERPL-CCNC: 37 U/L — HIGH
AST SERPL-CCNC: 37 U/L — HIGH
BASOPHILS # BLD AUTO: 0.04 K/UL — SIGNIFICANT CHANGE UP (ref 0–0.2)
BASOPHILS # BLD AUTO: 0.04 K/UL — SIGNIFICANT CHANGE UP (ref 0–0.2)
BASOPHILS NFR BLD AUTO: 0.5 % — SIGNIFICANT CHANGE UP (ref 0–2)
BASOPHILS NFR BLD AUTO: 0.5 % — SIGNIFICANT CHANGE UP (ref 0–2)
BILIRUB DIRECT SERPL-MCNC: 0.1 MG/DL — SIGNIFICANT CHANGE UP (ref 0–0.3)
BILIRUB DIRECT SERPL-MCNC: 0.1 MG/DL — SIGNIFICANT CHANGE UP (ref 0–0.3)
BILIRUB INDIRECT FLD-MCNC: 0.2 MG/DL — SIGNIFICANT CHANGE UP (ref 0.2–1)
BILIRUB INDIRECT FLD-MCNC: 0.2 MG/DL — SIGNIFICANT CHANGE UP (ref 0.2–1)
BILIRUB SERPL-MCNC: 0.3 MG/DL — LOW (ref 0.4–2)
BILIRUB SERPL-MCNC: 0.3 MG/DL — LOW (ref 0.4–2)
BUN SERPL-MCNC: 11.2 MG/DL — SIGNIFICANT CHANGE UP (ref 8–20)
BUN SERPL-MCNC: 11.2 MG/DL — SIGNIFICANT CHANGE UP (ref 8–20)
CALCIUM SERPL-MCNC: 9.4 MG/DL — SIGNIFICANT CHANGE UP (ref 8.4–10.5)
CALCIUM SERPL-MCNC: 9.4 MG/DL — SIGNIFICANT CHANGE UP (ref 8.4–10.5)
CHLORIDE SERPL-SCNC: 98 MMOL/L — SIGNIFICANT CHANGE UP (ref 96–108)
CHLORIDE SERPL-SCNC: 98 MMOL/L — SIGNIFICANT CHANGE UP (ref 96–108)
CO2 SERPL-SCNC: 23 MMOL/L — SIGNIFICANT CHANGE UP (ref 22–29)
CO2 SERPL-SCNC: 23 MMOL/L — SIGNIFICANT CHANGE UP (ref 22–29)
CREAT SERPL-MCNC: 0.69 MG/DL — SIGNIFICANT CHANGE UP (ref 0.5–1.3)
CREAT SERPL-MCNC: 0.69 MG/DL — SIGNIFICANT CHANGE UP (ref 0.5–1.3)
EGFR: 90 ML/MIN/1.73M2 — SIGNIFICANT CHANGE UP
EGFR: 90 ML/MIN/1.73M2 — SIGNIFICANT CHANGE UP
EOSINOPHIL # BLD AUTO: 0.49 K/UL — SIGNIFICANT CHANGE UP (ref 0–0.5)
EOSINOPHIL # BLD AUTO: 0.49 K/UL — SIGNIFICANT CHANGE UP (ref 0–0.5)
EOSINOPHIL NFR BLD AUTO: 6.2 % — HIGH (ref 0–6)
EOSINOPHIL NFR BLD AUTO: 6.2 % — HIGH (ref 0–6)
GLUCOSE SERPL-MCNC: 90 MG/DL — SIGNIFICANT CHANGE UP (ref 70–99)
GLUCOSE SERPL-MCNC: 90 MG/DL — SIGNIFICANT CHANGE UP (ref 70–99)
HCT VFR BLD CALC: 43.7 % — SIGNIFICANT CHANGE UP (ref 34.5–45)
HCT VFR BLD CALC: 43.7 % — SIGNIFICANT CHANGE UP (ref 34.5–45)
HGB BLD-MCNC: 14.6 G/DL — SIGNIFICANT CHANGE UP (ref 11.5–15.5)
HGB BLD-MCNC: 14.6 G/DL — SIGNIFICANT CHANGE UP (ref 11.5–15.5)
IMM GRANULOCYTES NFR BLD AUTO: 0.5 % — SIGNIFICANT CHANGE UP (ref 0–0.9)
IMM GRANULOCYTES NFR BLD AUTO: 0.5 % — SIGNIFICANT CHANGE UP (ref 0–0.9)
LYMPHOCYTES # BLD AUTO: 3.12 K/UL — SIGNIFICANT CHANGE UP (ref 1–3.3)
LYMPHOCYTES # BLD AUTO: 3.12 K/UL — SIGNIFICANT CHANGE UP (ref 1–3.3)
LYMPHOCYTES # BLD AUTO: 39.7 % — SIGNIFICANT CHANGE UP (ref 13–44)
LYMPHOCYTES # BLD AUTO: 39.7 % — SIGNIFICANT CHANGE UP (ref 13–44)
MAGNESIUM SERPL-MCNC: 2.3 MG/DL — SIGNIFICANT CHANGE UP (ref 1.6–2.6)
MAGNESIUM SERPL-MCNC: 2.3 MG/DL — SIGNIFICANT CHANGE UP (ref 1.6–2.6)
MCHC RBC-ENTMCNC: 31 PG — SIGNIFICANT CHANGE UP (ref 27–34)
MCHC RBC-ENTMCNC: 31 PG — SIGNIFICANT CHANGE UP (ref 27–34)
MCHC RBC-ENTMCNC: 33.4 GM/DL — SIGNIFICANT CHANGE UP (ref 32–36)
MCHC RBC-ENTMCNC: 33.4 GM/DL — SIGNIFICANT CHANGE UP (ref 32–36)
MCV RBC AUTO: 92.8 FL — SIGNIFICANT CHANGE UP (ref 80–100)
MCV RBC AUTO: 92.8 FL — SIGNIFICANT CHANGE UP (ref 80–100)
MONOCYTES # BLD AUTO: 0.54 K/UL — SIGNIFICANT CHANGE UP (ref 0–0.9)
MONOCYTES # BLD AUTO: 0.54 K/UL — SIGNIFICANT CHANGE UP (ref 0–0.9)
MONOCYTES NFR BLD AUTO: 6.9 % — SIGNIFICANT CHANGE UP (ref 2–14)
MONOCYTES NFR BLD AUTO: 6.9 % — SIGNIFICANT CHANGE UP (ref 2–14)
MRSA PCR RESULT.: SIGNIFICANT CHANGE UP
MRSA PCR RESULT.: SIGNIFICANT CHANGE UP
NEUTROPHILS # BLD AUTO: 3.62 K/UL — SIGNIFICANT CHANGE UP (ref 1.8–7.4)
NEUTROPHILS # BLD AUTO: 3.62 K/UL — SIGNIFICANT CHANGE UP (ref 1.8–7.4)
NEUTROPHILS NFR BLD AUTO: 46.2 % — SIGNIFICANT CHANGE UP (ref 43–77)
NEUTROPHILS NFR BLD AUTO: 46.2 % — SIGNIFICANT CHANGE UP (ref 43–77)
PHOSPHATE SERPL-MCNC: 3.7 MG/DL — SIGNIFICANT CHANGE UP (ref 2.4–4.7)
PHOSPHATE SERPL-MCNC: 3.7 MG/DL — SIGNIFICANT CHANGE UP (ref 2.4–4.7)
PLATELET # BLD AUTO: 125 K/UL — LOW (ref 150–400)
PLATELET # BLD AUTO: 125 K/UL — LOW (ref 150–400)
POTASSIUM SERPL-MCNC: 4.1 MMOL/L — SIGNIFICANT CHANGE UP (ref 3.5–5.3)
POTASSIUM SERPL-MCNC: 4.1 MMOL/L — SIGNIFICANT CHANGE UP (ref 3.5–5.3)
POTASSIUM SERPL-SCNC: 4.1 MMOL/L — SIGNIFICANT CHANGE UP (ref 3.5–5.3)
POTASSIUM SERPL-SCNC: 4.1 MMOL/L — SIGNIFICANT CHANGE UP (ref 3.5–5.3)
PROT SERPL-MCNC: 7.2 G/DL — SIGNIFICANT CHANGE UP (ref 6.6–8.7)
PROT SERPL-MCNC: 7.2 G/DL — SIGNIFICANT CHANGE UP (ref 6.6–8.7)
RBC # BLD: 4.71 M/UL — SIGNIFICANT CHANGE UP (ref 3.8–5.2)
RBC # BLD: 4.71 M/UL — SIGNIFICANT CHANGE UP (ref 3.8–5.2)
RBC # FLD: 12.1 % — SIGNIFICANT CHANGE UP (ref 10.3–14.5)
RBC # FLD: 12.1 % — SIGNIFICANT CHANGE UP (ref 10.3–14.5)
S AUREUS DNA NOSE QL NAA+PROBE: SIGNIFICANT CHANGE UP
S AUREUS DNA NOSE QL NAA+PROBE: SIGNIFICANT CHANGE UP
SODIUM SERPL-SCNC: 135 MMOL/L — SIGNIFICANT CHANGE UP (ref 135–145)
SODIUM SERPL-SCNC: 135 MMOL/L — SIGNIFICANT CHANGE UP (ref 135–145)
WBC # BLD: 7.85 K/UL — SIGNIFICANT CHANGE UP (ref 3.8–10.5)
WBC # BLD: 7.85 K/UL — SIGNIFICANT CHANGE UP (ref 3.8–10.5)
WBC # FLD AUTO: 7.85 K/UL — SIGNIFICANT CHANGE UP (ref 3.8–10.5)
WBC # FLD AUTO: 7.85 K/UL — SIGNIFICANT CHANGE UP (ref 3.8–10.5)

## 2023-10-21 PROCEDURE — 99223 1ST HOSP IP/OBS HIGH 75: CPT

## 2023-10-21 PROCEDURE — 99233 SBSQ HOSP IP/OBS HIGH 50: CPT

## 2023-10-21 RX ORDER — ACETAMINOPHEN 500 MG
650 TABLET ORAL EVERY 6 HOURS
Refills: 0 | Status: DISCONTINUED | OUTPATIENT
Start: 2023-10-21 | End: 2023-10-23

## 2023-10-21 RX ORDER — MORPHINE SULFATE 50 MG/1
4 CAPSULE, EXTENDED RELEASE ORAL ONCE
Refills: 0 | Status: DISCONTINUED | OUTPATIENT
Start: 2023-10-21 | End: 2023-10-21

## 2023-10-21 RX ORDER — ATORVASTATIN CALCIUM 80 MG/1
40 TABLET, FILM COATED ORAL AT BEDTIME
Refills: 0 | Status: DISCONTINUED | OUTPATIENT
Start: 2023-10-21 | End: 2023-10-23

## 2023-10-21 RX ORDER — FOLIC ACID 0.8 MG
1 TABLET ORAL DAILY
Refills: 0 | Status: DISCONTINUED | OUTPATIENT
Start: 2023-10-21 | End: 2023-10-23

## 2023-10-21 RX ORDER — ATORVASTATIN CALCIUM 80 MG/1
1 TABLET, FILM COATED ORAL
Refills: 0 | DISCHARGE

## 2023-10-21 RX ORDER — ATORVASTATIN CALCIUM 80 MG/1
1 TABLET, FILM COATED ORAL
Qty: 0 | Refills: 0 | DISCHARGE

## 2023-10-21 RX ORDER — ENOXAPARIN SODIUM 100 MG/ML
40 INJECTION SUBCUTANEOUS EVERY 24 HOURS
Refills: 0 | Status: DISCONTINUED | OUTPATIENT
Start: 2023-10-21 | End: 2023-10-23

## 2023-10-21 RX ORDER — LANOLIN ALCOHOL/MO/W.PET/CERES
3 CREAM (GRAM) TOPICAL AT BEDTIME
Refills: 0 | Status: DISCONTINUED | OUTPATIENT
Start: 2023-10-21 | End: 2023-10-23

## 2023-10-21 RX ORDER — ONDANSETRON 8 MG/1
4 TABLET, FILM COATED ORAL EVERY 8 HOURS
Refills: 0 | Status: DISCONTINUED | OUTPATIENT
Start: 2023-10-21 | End: 2023-10-23

## 2023-10-21 RX ORDER — THIAMINE MONONITRATE (VIT B1) 100 MG
100 TABLET ORAL DAILY
Refills: 0 | Status: DISCONTINUED | OUTPATIENT
Start: 2023-10-21 | End: 2023-10-23

## 2023-10-21 RX ADMIN — MORPHINE SULFATE 4 MILLIGRAM(S): 50 CAPSULE, EXTENDED RELEASE ORAL at 00:23

## 2023-10-21 RX ADMIN — Medication 15 MILLIGRAM(S): at 12:49

## 2023-10-21 RX ADMIN — MORPHINE SULFATE 4 MILLIGRAM(S): 50 CAPSULE, EXTENDED RELEASE ORAL at 01:20

## 2023-10-21 RX ADMIN — Medication 100 MILLIGRAM(S): at 12:36

## 2023-10-21 RX ADMIN — Medication 2000 MILLIGRAM(S): at 05:16

## 2023-10-21 RX ADMIN — Medication 15 MILLIGRAM(S): at 18:18

## 2023-10-21 RX ADMIN — Medication 2000 MILLIGRAM(S): at 21:28

## 2023-10-21 RX ADMIN — Medication 1 TABLET(S): at 12:36

## 2023-10-21 RX ADMIN — Medication 1 MILLIGRAM(S): at 12:36

## 2023-10-21 RX ADMIN — Medication 2000 MILLIGRAM(S): at 14:46

## 2023-10-21 RX ADMIN — ATORVASTATIN CALCIUM 40 MILLIGRAM(S): 80 TABLET, FILM COATED ORAL at 21:28

## 2023-10-21 RX ADMIN — ENOXAPARIN SODIUM 40 MILLIGRAM(S): 100 INJECTION SUBCUTANEOUS at 12:37

## 2023-10-21 NOTE — H&P ADULT - ASSESSMENT
73y F w/ hx HLD, squamous cell carcinoma s/p recent Mohs surgery on Wednesday presented with complaints of  R wrist/forearm pain and swelling.  Patient reports progressively worsening swelling and pain of the hand since the surgery. She reports that she tried to speak to her Dermatologist on Thursday but was not able to be seen and was advised to go to ED if worsening.. Seen by Hand surgery in ED, who recommended IV abx and hand elevation with no acute surgical interventions. CT of hand showed cellulitis without any abscess collection. ED discussed with patients Dermatologist who stated that swelling can be post operatively but advised to treat as cellulitis. Patient placed in observation and then reported no improvement after 24 hours and advised admission to medicine. ID was consulted by ED. Patient admitted to medicine for further management of right hand cellulitis.       #Right hand cellulitis  CT of right upper extremity: Subcutaneous edema suggests cellulitis with the given history without   appreciated abscess or gas.  Seen by hand surgery who recommended hand elevation and abx  Placed on cefazolin - Will continue for now  MRSA swab  Blood cx x 2 with NGTD  ID consulted  Arm elevation    #HLD  Atorvastatin 40mg QHS    #Elevated blood pressure  May be secondary to pain  Monitor for today    #Nicotine abuse  Counseled  Refused patch    #Alcohol use  Drinks 1-2 Glasses of wine every 1-2 days  Will place on CIWA symptom triggered protocol  Monitor for now    DVT prophylaxis: Lovenox

## 2023-10-21 NOTE — H&P ADULT - NSHPPHYSICALEXAM_GEN_ALL_CORE
PHYSICAL EXAM:  Vital Signs Last 24 Hrs  T(F): 97.9 (21 Oct 2023 07:30), Max: 99.2 (20 Oct 2023 10:25)  HR: 63 (21 Oct 2023 07:30) (63 - 70)  BP: 156/74 (21 Oct 2023 07:30) (95/50 - 156/74)  RR: 18 (21 Oct 2023 07:30) (17 - 20)  SpO2: 97% (21 Oct 2023 07:30) (95% - 98%)    GENERAL: NAD, Resting in bed  Eyes: EOMI, PERRLA  ENMT: Conjunctiva and sclera clear; supple neck, No JVD  Cardiovascular: S1,S2, RRR, No murmur  Respiratory: CTA B/L, Non-labored breathing  GI: Bowel sounds present; Soft, Nontender, Nondistended. No hepatomegaly  Genitourinary: Deferred  Skin:  RUE with swelling of hand noted, Palpable pulse, decreased movement of right fingers  Neurology: Alert & Oriented X3, non-focal and spontaneous movements of all extremities, CN 2 to 12 grossly intact   Psych: Appropriate mood and affect, calm, pleasant, Responds appropriately to questions

## 2023-10-21 NOTE — ED ADULT NURSE REASSESSMENT NOTE - NS ED NURSE REASSESS COMMENT FT1
received patient A&Ox3, resp wnl, c/o right hand infection, + swelling, denies pain, NAD, VSS received patient A&Ox3, resp wnl, c/o right hand infection, + swelling, denies pain, NAD,  vital signs stable, right hand + redness & swelling, rt hand elevated at all times

## 2023-10-21 NOTE — CONSULT NOTE ADULT - ASSESSMENT
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     Right arm cellulitis   Skin SCC     - Started on cefazolin with mild clinical improvement  - Monitor closely. Would hold on adding MRSA coverage unless symptoms fail to improve or worsen   - Right arm elevation and local wound care to surgical site   - CT RLE with findings c/w SSTI. No collections or gas.   - BCx drawn on admission - ngtd   - MRSA nasal swab neg   - Mild leukocytosis already resolved   - anticipate discharge on oral abx soon as patient needs to attend brother's      D/w Dr. Romero

## 2023-10-21 NOTE — ED CDU PROVIDER SUBSEQUENT DAY NOTE - CLINICAL SUMMARY MEDICAL DECISION MAKING FREE TEXT BOX
73y F w/ hx HLD, squamous cell carcinoma s/p recent Mohs surgery 2 days ago to R wrist/forearm; presents for arm/hand pain. Leukocytosis with left shift. no electrolyte abnl. CT of RUE was performed- showed subcutaneous edema suggesting cellulitis. Hand was consulted saw the pt who recommended IV abx, no acute interventions. Initial ER team spoke with dermatologist Dr. Martínez- advised likely more edema than cellulitis, however will treat pt for cellulitis.  Pt placed in obs for cellulitis, receiving ancef.

## 2023-10-21 NOTE — ED CDU PROVIDER SUBSEQUENT DAY NOTE - PHYSICAL EXAMINATION
Gen: No acute distress, non toxic  HEENT: Mucous membranes moist, pink conjunctivae, EOMI  CV: RRR, nl s1/s2.  Resp: CTAB, normal rate and effort  GI: Abdomen soft, NT, ND. No rebound, no guarding  Neuro: A&O x4, MAEx4. 5/5 str ext x 4. Sensation intact, symmetric throughout. No fnd's  MSK: +ttp to the distal rt wrist/hand. Limited ROM of rt wrist secondary to pain. compartments soft/compressible.   Skin: Surgical incision to right dorsal forearm/wrist, dressing in place., +mild surrounding erythema, +edema of right arm/hand distally, Skin intact and perfused. cap refill <2sec  Vascular: Radial pulses 2+ b/l.

## 2023-10-21 NOTE — H&P ADULT - NSHPSOCIALHISTORY_GEN_ALL_CORE
Reports to smoke 1/4-1/2 PPD x 10 years  Reports to drink 1-2 cups of wine every day/other day  Denies illicit substances

## 2023-10-21 NOTE — CONSULT NOTE ADULT - SUBJECTIVE AND OBJECTIVE BOX
[facility]  INFECTIOUS DISEASES at [place] and [place]  =====================================================                               Gurdeep Kelly MD*    Jerri Yepez MD*                             Marisol James MD*     Racquel Echeverria MD*            Diplomates American Board of Internal Medicine & Infectious Diseases                * [place] Office - Appt - Tel  [phone] Fax [phone]                * [place] Office - Appt - Tel [phone] Fax [phone]                                  Hospital Consult line:  [phone]  =====================================================      [id number]  ALEX MAYFIELD        CC: Patient is a 73y old  Female who presents with a chief complaint of Right hand cellulitis (21 Oct 2023 10:11)      HPI: 73y F w/ hx HLD, squamous cell carcinoma s/p recent Mohs surgery on Wednesday presented with complaints of  R wrist/forearm pain and swelling.  Patient reports progressively worsening swelling and pain of the hand since the surgery. She reports that she tried to speak to her Dermatologist on Thursday but was not able to be seen and was advised to go to ED if worsening.. Seen by Hand surgery in ED, who recommended IV abx and hand elevation with no acute surgical interventions. CT of hand showed cellulitis without any abscess collection. ED discussed with patients Dermatologist who stated that swelling can be post operatively but advised to treat as cellulitis. Patient placed in observation and then reported no improvement after 24 hours and advised admission to medicine. ID was consulted by ED. Patient admitted to medicine for further management of right hand cellulitis.    (21 Oct 2023 10:11)    ROS: on cefazolin - admits on mild improvement. Was using mupirocin cream on wound. No pets at home.   ______________________________________________________  PAST MEDICAL & SURGICAL HISTORY:  Hypercholesterolemia    H/O tubal ligation    History of right salpingo-oophorectomy  2/2 ectopic pregnancy    Status post Mohs surgery  squamous cell carcinoma -- right arm      Social History:  Reports to smoke 1/4-1/2 PPD x 10 years  Reports to drink 1-2 cups of wine every day/other day  Denies illicit substances (21 Oct 2023 10:11)  Retired     FAMILY HISTORY:  No pertinent family history in first degree relatives        ______________________________________________________  Allergies    adhesives (Rash)  No Known Drug Allergies    Intolerances        ______________________________________________________  MEDICATIONS:  Antibiotics:  ceFAZolin  Injectable. 2000 milliGRAM(s) IV Push every 8 hours    Other medications:  atorvastatin 40 milliGRAM(s) Oral at bedtime  enoxaparin Injectable 40 milliGRAM(s) SubCutaneous every 24 hours  folic acid 1 milliGRAM(s) Oral daily  multivitamin 1 Tablet(s) Oral daily  thiamine 100 milliGRAM(s) Oral daily    ______________________________________________________  REVIEW OF SYSTEMS:  CONSTITUTIONAL:  No fever or chills  HEENT:  No diplopia or blurred vision.  No earache, sore throat or runny nose.  CARDIOVASCULAR:  No chest pain  RESPIRATORY:  No cough, shortness of breath  GASTROINTESTINAL:  No nausea, vomiting, abdominal pain or diarrhea.  GENITOURINARY:  No dysuria, frequency or urgency. No blood in urine  MUSCULOSKELETAL:  no joint aches, no muscle pain  SKIN:  as per HPI   NEUROLOGIC:  No headaches, seizures  PSYCHIATRIC:  No disorder of thought or mood.  ENDOCRINE:  No heat or cold intolerance  HEMATOLOGICAL:  No easy bruising or bleeding.     _____________________________________________________  PHYSICAL EXAM:  GEN: AAOX4 in NAD   HEENT: normocephalic and atraumatic. .  Anicteric sclerae. Moist mucous membranes. No mucosal lesions. No nasal discharge.   NECK: Supple. No palpable neck masses  LUNGS: eupneic, CTA B/L, no adventitious sounds  HEART: RRR, no m/r/g  ABDOMEN: Soft, NT, ND.  +BS.    : no Ervin catheter  EXTREMITIES: Right arm - marked erythema, swelling, tenderness from mid forearm to hand, with bullae formation more localized around surgical incision. No purulence. Wrist and digits ROM severely limited.   MSK: No clubbing or cyanosis   LYMPH: no palpable cervical, supraclavicular lymph nodes  NEUROLOGIC: Grossly no focal deficits   PSYCHIATRIC: Appropriate affect and mood.  SKIN: as described above   LINES: PIV     ______________________________________________________      Vitals:  T(F): 97.9 (21 Oct 2023 07:30), Max: 98.7 (21 Oct 2023 05:12)  HR: 66 (21 Oct 2023 11:18)  BP: 152/72 (21 Oct 2023 11:18)  RR: 18 (21 Oct 2023 11:18)  SpO2: 95% (21 Oct 2023 11:18) (95% - 97%)  temp max in last 48H T(F): , Max: 99.2 (10-20-23 @ 10:25)    Current Antibiotics:  ceFAZolin  Injectable. 2000 milliGRAM(s) IV Push every 8 hours    Other medications:  atorvastatin 40 milliGRAM(s) Oral at bedtime  enoxaparin Injectable 40 milliGRAM(s) SubCutaneous every 24 hours  folic acid 1 milliGRAM(s) Oral daily  multivitamin 1 Tablet(s) Oral daily  thiamine 100 milliGRAM(s) Oral daily                            14.6   7.85  )-----------( 125      ( 21 Oct 2023 05:30 )             43.7     10-20    141  |  106  |  9.5  ----------------------------<  104<H>  4.3   |  20.0<L>  |  0.64    Ca    9.6      20 Oct 2023 05:30    TPro  7.4  /  Alb  4.6  /  TBili  0.4  /  DBili  x   /  AST  26  /  ALT  28  /  AlkPhos  80  10-20    RECENT CULTURES:  10-20 @ 05:30 .Blood Blood-Peripheral     No growth at 24 hours      10-20 @ 05:00 .Blood Blood-Peripheral     No growth at 24 hours      WBC Count: 7.85 K/uL (10-21-23 @ 05:30)  WBC Count: 10.99 K/uL (10-20-23 @ 05:30)    Creatinine: 0.64 mg/dL (10-20-23 @ 05:30)    ______________________________________________________  RADIOLOGY  < from: CT Upper Extremity w/ IV Cont, Right (10.20.23 @ 06:30) >    FINDINGS:    OSSEOUS STRUCTURES    Fractures:  None.    RIGHT ELBOW JOINTS    Joint Space(s):  Maintained.    Effusion:  None.    RIGHT WRIST/HAND JOINTS    Joint Space(s):  Mild ulnar positive variance is present with adjacent   subchondral cystic change of the proximal lunate suggesting ulnar   abutment. Remaining joint spaces appear maintained.    MYOTENDINOUS STRUCTURES  Intact within limits of CT.  Accessory anconeus epitrochlearis muscle is noted.    NEUROVASCULAR STRUCTURES  Unremarkable within limits of CT.    OTHER SOFT TISSUES  There is mild to moderate subcutaneous edema involving the dorsum of the   forearm with more severe edema of the wrist and hand. Cutaneous   irregularity or blistering is seen along the dorsum of the distal forearm   and wrist. No abscess or gas is seen.    IMPRESSION:  1.  Subcutaneous edema suggests cellulitis with the given history without   appreciated abscess or gas.    < end of copied text >

## 2023-10-21 NOTE — H&P ADULT - HISTORY OF PRESENT ILLNESS
73y F w/ hx HLD, squamous cell carcinoma s/p recent Mohs surgery on Wednesday presented with complaints of  R wrist/forearm pain and swelling.  Patient reports progressively worsening swelling and pain of the hand since the surgery. She reports that she tried to speak to her Dermatologist on Thursday but was not able to be seen and was advised to go to ED if worsening.. Seen by Hand surgery in ED, who recommended IV abx and hand elevation with no acute surgical interventions. CT of hand showed cellulitis without any abscess collection. ED discussed with patients Dermatologist who stated that swelling can be post operatively but advised to treat as cellulitis. Patient placed in observation and then reported no improvement after 24 hours and advised admission to medicine. ID was consulted by ED. Patient admitted to medicine for further management of right hand cellulitis.

## 2023-10-21 NOTE — ED CDU PROVIDER SUBSEQUENT DAY NOTE - PROGRESS NOTE DETAILS
Seen the patient at the bedside. her right wrist/forearm wrapped with Ace bandages. and dressing patient still being of the swelling of the hands. dressing removed ,  suture is in place erythema around the suture noted. provide mupirocin cream over the suture area.  Reviewed less than 3 seconds. Xeroform dressing.  patient is keep elevating her hand/ applied a cold compress over the area as well.  consult ID as well.

## 2023-10-21 NOTE — ED CDU PROVIDER SUBSEQUENT DAY NOTE - NS ED ROS FT
Gen: denies fever, chills, fatigue, weight loss  Skin: denies rashes  HEENT: denies visual changes, ear pain, nasal congestion, throat pain  Respiratory: denies OLIVEROS, SOB, cough, wheezing  Cardiovascular: denies chest pain, palpitations, diaphoresis, LE edema  GI: denies abdominal pain, n/v/d  : denies dysuria, frequency, urgency, bowel/bladder incontinence  MSK: +rt hand swelling and pain. denies back pain, neck pain  Neuro: denies headache, dizziness, weakness, numbness  Psych: denies anxiety, depression, SI/HI, visual/auditory hallucinations

## 2023-10-21 NOTE — ED CDU PROVIDER SUBSEQUENT DAY NOTE - HISTORY
[FreeTextEntry1] : 13-year-old male with adolescent idiopathic scoliosis and limb length discrepancy\par \par Physical exam and imaging reviewed with patient and mother at length. Natural history of above conditions discussed.Child is 13 years of age, Risser 4-5. He is nearing skeletal maturity and scoliosis is unlikely to progress. I recommended physical therapy for back and core strengthening and postural support. Prescription provided. Home exercise handouts provided as well. He has been evaluated by orthotist for a new left-sided shoe lift. Followup in 6 months with AP and lateral spine x-ray at that time.All questions answered, understanding verbalized. Parent and patient in agreement with plan of care.\par \par I, Edna Mendez, have acted as a scribe and documented the above information for Dr. George\par \par The above documentation completed by the scribe is an accurate record of both my words and actions.\par  Pt resting comfortably at time of re-assessment. No events overnight. Pending reassessment. Will continue to monitor.

## 2023-10-21 NOTE — ED CDU PROVIDER DISPOSITION NOTE - ATTENDING CONTRIBUTION TO CARE
I agree with the PA's note and was available for any issues/concerns. I was directly involved in patient care. My brief overall assessment is as follows:    minimal improvement in symptoms; infectious diease consulted; admit

## 2023-10-21 NOTE — ED CDU PROVIDER DISPOSITION NOTE - CLINICAL COURSE
73y F w/ hx HLD, squamous cell carcinoma s/p recent Mohs surgery 3 days ago to R wrist/forearm; presents for arm/hand pain. Surgery was performed by Dr. Michelle. Pt reports progressively worsening swelling around the wound since the surgery. Called her dermatologist but was not able to be seen so came to the ED. No fever or chills. pt with mildly elevated wbc, erythema at the wound site. Ct show edema consistent with cellulitis, no abscess. Patient placed in observation for IV antibiotics. [facility] was consulted. patient keeps the hand overnight elevated. seen the patient at the in the morning at the bedside. patient did not felt any improvement overall yet. right hand on palmar aspect still swollen. cap refill less than 3 seconds warm to touch. soft compartments swollen. right wrist on the dorsum status post surgery status post suture wound noted with erythema around the wound. consult infectious disease. spoke with the attending  will admit the patient. explained the patient that she needs further evaluation IV antibiotic therapy

## 2023-10-22 LAB
A1C WITH ESTIMATED AVERAGE GLUCOSE RESULT: 5.4 % — SIGNIFICANT CHANGE UP (ref 4–5.6)
A1C WITH ESTIMATED AVERAGE GLUCOSE RESULT: 5.4 % — SIGNIFICANT CHANGE UP (ref 4–5.6)
ALBUMIN SERPL ELPH-MCNC: 3.9 G/DL — SIGNIFICANT CHANGE UP (ref 3.3–5.2)
ALBUMIN SERPL ELPH-MCNC: 3.9 G/DL — SIGNIFICANT CHANGE UP (ref 3.3–5.2)
ALP SERPL-CCNC: 72 U/L — SIGNIFICANT CHANGE UP (ref 40–120)
ALP SERPL-CCNC: 72 U/L — SIGNIFICANT CHANGE UP (ref 40–120)
ALT FLD-CCNC: 20 U/L — SIGNIFICANT CHANGE UP
ALT FLD-CCNC: 20 U/L — SIGNIFICANT CHANGE UP
ANION GAP SERPL CALC-SCNC: 12 MMOL/L — SIGNIFICANT CHANGE UP (ref 5–17)
ANION GAP SERPL CALC-SCNC: 12 MMOL/L — SIGNIFICANT CHANGE UP (ref 5–17)
AST SERPL-CCNC: 23 U/L — SIGNIFICANT CHANGE UP
AST SERPL-CCNC: 23 U/L — SIGNIFICANT CHANGE UP
BASOPHILS # BLD AUTO: 0.05 K/UL — SIGNIFICANT CHANGE UP (ref 0–0.2)
BASOPHILS # BLD AUTO: 0.05 K/UL — SIGNIFICANT CHANGE UP (ref 0–0.2)
BASOPHILS NFR BLD AUTO: 0.6 % — SIGNIFICANT CHANGE UP (ref 0–2)
BASOPHILS NFR BLD AUTO: 0.6 % — SIGNIFICANT CHANGE UP (ref 0–2)
BILIRUB SERPL-MCNC: 0.4 MG/DL — SIGNIFICANT CHANGE UP (ref 0.4–2)
BILIRUB SERPL-MCNC: 0.4 MG/DL — SIGNIFICANT CHANGE UP (ref 0.4–2)
BUN SERPL-MCNC: 12.7 MG/DL — SIGNIFICANT CHANGE UP (ref 8–20)
BUN SERPL-MCNC: 12.7 MG/DL — SIGNIFICANT CHANGE UP (ref 8–20)
CALCIUM SERPL-MCNC: 9 MG/DL — SIGNIFICANT CHANGE UP (ref 8.4–10.5)
CALCIUM SERPL-MCNC: 9 MG/DL — SIGNIFICANT CHANGE UP (ref 8.4–10.5)
CHLORIDE SERPL-SCNC: 106 MMOL/L — SIGNIFICANT CHANGE UP (ref 96–108)
CHLORIDE SERPL-SCNC: 106 MMOL/L — SIGNIFICANT CHANGE UP (ref 96–108)
CO2 SERPL-SCNC: 24 MMOL/L — SIGNIFICANT CHANGE UP (ref 22–29)
CO2 SERPL-SCNC: 24 MMOL/L — SIGNIFICANT CHANGE UP (ref 22–29)
CREAT SERPL-MCNC: 0.76 MG/DL — SIGNIFICANT CHANGE UP (ref 0.5–1.3)
CREAT SERPL-MCNC: 0.76 MG/DL — SIGNIFICANT CHANGE UP (ref 0.5–1.3)
EGFR: 82 ML/MIN/1.73M2 — SIGNIFICANT CHANGE UP
EGFR: 82 ML/MIN/1.73M2 — SIGNIFICANT CHANGE UP
EOSINOPHIL # BLD AUTO: 0.54 K/UL — HIGH (ref 0–0.5)
EOSINOPHIL # BLD AUTO: 0.54 K/UL — HIGH (ref 0–0.5)
EOSINOPHIL NFR BLD AUTO: 6.5 % — HIGH (ref 0–6)
EOSINOPHIL NFR BLD AUTO: 6.5 % — HIGH (ref 0–6)
ESTIMATED AVERAGE GLUCOSE: 108 MG/DL — SIGNIFICANT CHANGE UP (ref 68–114)
ESTIMATED AVERAGE GLUCOSE: 108 MG/DL — SIGNIFICANT CHANGE UP (ref 68–114)
GLUCOSE SERPL-MCNC: 100 MG/DL — HIGH (ref 70–99)
GLUCOSE SERPL-MCNC: 100 MG/DL — HIGH (ref 70–99)
HCT VFR BLD CALC: 45.2 % — HIGH (ref 34.5–45)
HCT VFR BLD CALC: 45.2 % — HIGH (ref 34.5–45)
HGB BLD-MCNC: 14.8 G/DL — SIGNIFICANT CHANGE UP (ref 11.5–15.5)
HGB BLD-MCNC: 14.8 G/DL — SIGNIFICANT CHANGE UP (ref 11.5–15.5)
IMM GRANULOCYTES NFR BLD AUTO: 0.5 % — SIGNIFICANT CHANGE UP (ref 0–0.9)
IMM GRANULOCYTES NFR BLD AUTO: 0.5 % — SIGNIFICANT CHANGE UP (ref 0–0.9)
LYMPHOCYTES # BLD AUTO: 2.41 K/UL — SIGNIFICANT CHANGE UP (ref 1–3.3)
LYMPHOCYTES # BLD AUTO: 2.41 K/UL — SIGNIFICANT CHANGE UP (ref 1–3.3)
LYMPHOCYTES # BLD AUTO: 29.2 % — SIGNIFICANT CHANGE UP (ref 13–44)
LYMPHOCYTES # BLD AUTO: 29.2 % — SIGNIFICANT CHANGE UP (ref 13–44)
MAGNESIUM SERPL-MCNC: 2.3 MG/DL — SIGNIFICANT CHANGE UP (ref 1.6–2.6)
MAGNESIUM SERPL-MCNC: 2.3 MG/DL — SIGNIFICANT CHANGE UP (ref 1.6–2.6)
MCHC RBC-ENTMCNC: 30.5 PG — SIGNIFICANT CHANGE UP (ref 27–34)
MCHC RBC-ENTMCNC: 30.5 PG — SIGNIFICANT CHANGE UP (ref 27–34)
MCHC RBC-ENTMCNC: 32.7 GM/DL — SIGNIFICANT CHANGE UP (ref 32–36)
MCHC RBC-ENTMCNC: 32.7 GM/DL — SIGNIFICANT CHANGE UP (ref 32–36)
MCV RBC AUTO: 93 FL — SIGNIFICANT CHANGE UP (ref 80–100)
MCV RBC AUTO: 93 FL — SIGNIFICANT CHANGE UP (ref 80–100)
MONOCYTES # BLD AUTO: 0.47 K/UL — SIGNIFICANT CHANGE UP (ref 0–0.9)
MONOCYTES # BLD AUTO: 0.47 K/UL — SIGNIFICANT CHANGE UP (ref 0–0.9)
MONOCYTES NFR BLD AUTO: 5.7 % — SIGNIFICANT CHANGE UP (ref 2–14)
MONOCYTES NFR BLD AUTO: 5.7 % — SIGNIFICANT CHANGE UP (ref 2–14)
NEUTROPHILS # BLD AUTO: 4.74 K/UL — SIGNIFICANT CHANGE UP (ref 1.8–7.4)
NEUTROPHILS # BLD AUTO: 4.74 K/UL — SIGNIFICANT CHANGE UP (ref 1.8–7.4)
NEUTROPHILS NFR BLD AUTO: 57.5 % — SIGNIFICANT CHANGE UP (ref 43–77)
NEUTROPHILS NFR BLD AUTO: 57.5 % — SIGNIFICANT CHANGE UP (ref 43–77)
PHOSPHATE SERPL-MCNC: 3.4 MG/DL — SIGNIFICANT CHANGE UP (ref 2.4–4.7)
PHOSPHATE SERPL-MCNC: 3.4 MG/DL — SIGNIFICANT CHANGE UP (ref 2.4–4.7)
PLATELET # BLD AUTO: 114 K/UL — LOW (ref 150–400)
PLATELET # BLD AUTO: 114 K/UL — LOW (ref 150–400)
POTASSIUM SERPL-MCNC: 4.2 MMOL/L — SIGNIFICANT CHANGE UP (ref 3.5–5.3)
POTASSIUM SERPL-MCNC: 4.2 MMOL/L — SIGNIFICANT CHANGE UP (ref 3.5–5.3)
POTASSIUM SERPL-SCNC: 4.2 MMOL/L — SIGNIFICANT CHANGE UP (ref 3.5–5.3)
POTASSIUM SERPL-SCNC: 4.2 MMOL/L — SIGNIFICANT CHANGE UP (ref 3.5–5.3)
PROT SERPL-MCNC: 6.3 G/DL — LOW (ref 6.6–8.7)
PROT SERPL-MCNC: 6.3 G/DL — LOW (ref 6.6–8.7)
RBC # BLD: 4.86 M/UL — SIGNIFICANT CHANGE UP (ref 3.8–5.2)
RBC # BLD: 4.86 M/UL — SIGNIFICANT CHANGE UP (ref 3.8–5.2)
RBC # FLD: 12 % — SIGNIFICANT CHANGE UP (ref 10.3–14.5)
RBC # FLD: 12 % — SIGNIFICANT CHANGE UP (ref 10.3–14.5)
SODIUM SERPL-SCNC: 142 MMOL/L — SIGNIFICANT CHANGE UP (ref 135–145)
SODIUM SERPL-SCNC: 142 MMOL/L — SIGNIFICANT CHANGE UP (ref 135–145)
WBC # BLD: 8.25 K/UL — SIGNIFICANT CHANGE UP (ref 3.8–10.5)
WBC # BLD: 8.25 K/UL — SIGNIFICANT CHANGE UP (ref 3.8–10.5)
WBC # FLD AUTO: 8.25 K/UL — SIGNIFICANT CHANGE UP (ref 3.8–10.5)
WBC # FLD AUTO: 8.25 K/UL — SIGNIFICANT CHANGE UP (ref 3.8–10.5)

## 2023-10-22 PROCEDURE — 99232 SBSQ HOSP IP/OBS MODERATE 35: CPT

## 2023-10-22 RX ORDER — INFLUENZA VIRUS VACCINE 15; 15; 15; 15 UG/.5ML; UG/.5ML; UG/.5ML; UG/.5ML
0.7 SUSPENSION INTRAMUSCULAR ONCE
Refills: 0 | Status: DISCONTINUED | OUTPATIENT
Start: 2023-10-22 | End: 2023-10-23

## 2023-10-22 RX ADMIN — Medication 1 TABLET(S): at 13:43

## 2023-10-22 RX ADMIN — Medication 2000 MILLIGRAM(S): at 06:14

## 2023-10-22 RX ADMIN — ATORVASTATIN CALCIUM 40 MILLIGRAM(S): 80 TABLET, FILM COATED ORAL at 21:18

## 2023-10-22 RX ADMIN — Medication 2000 MILLIGRAM(S): at 21:19

## 2023-10-22 RX ADMIN — Medication 1 MILLIGRAM(S): at 13:43

## 2023-10-22 RX ADMIN — Medication 100 MILLIGRAM(S): at 13:43

## 2023-10-22 RX ADMIN — ENOXAPARIN SODIUM 40 MILLIGRAM(S): 100 INJECTION SUBCUTANEOUS at 21:18

## 2023-10-22 RX ADMIN — Medication 2000 MILLIGRAM(S): at 13:43

## 2023-10-22 NOTE — PATIENT PROFILE ADULT - FUNCTIONAL ASSESSMENT - BASIC MOBILITY 6.
4-calculated by average/Not able to assess (calculate score using [facility] averaging method)

## 2023-10-22 NOTE — PROGRESS NOTE ADULT - SUBJECTIVE AND OBJECTIVE BOX
[facility]  INFECTIOUS DISEASES at [place] and [place]  ===============================================================                               Gurdeep Kelly MD*     Jerri Yepez MD*                         Marisol James MD*       Racquel Echeverria MD*            Diplomates American Board of Internal Medicine & Infectious Diseases                * [place] Office - Appt - Tel  [phone] Fax [phone]                * [place] Office - Appt - Tel [phone] Fax [phone]                                  Hospital Consult line:  [phone]  ==============================================================    ALEX MAYFIELD [id number]    Follow up: right hand cellulitis post Mohs surgery     No acute events overnight  Afebrile and hemodynamically stable     I have personally reviewed the labs and data; pertinent labs and data are listed in this note; please see below.     _______________________________________________________________  REVIEW OF SYSTEMS  Feeling better. Swelling improved. Able to move fingers with more ease. Tolerating abx w/o noticeable side effects. Denies nausea, vomiting, diarrhea, fever or chills.   ________________________________________________________________  Allergies:  adhesives (Rash)  No Known Drug Allergies      ________________________________________________________________  PHYSICAL EXAM  GEN: in NAD, sitting in bed.   HEENT: Anicteric sclerae   NECK: Supple.  LUNGS: eupneic.  : No Ervin catheter  EXTREMITIES: Right arm - swelling and erythema from hand to mid forearm improving. Blisters surrounding surgical incision and on hand. No purulence for incision. ROM improving: able to oppose thumb to 2 and 3 digits. Unable to make a fist yet.   NEUROLOGIC: AAOX4, no motor focal deficits   PSYCHIATRIC: Appropriate affect and mood  SKIN: as described above   LINES: PIV   ________________________________________________________________  Vitals:  T(F): 98 (22 Oct 2023 11:21), Max: 98.4 (21 Oct 2023 15:05)  HR: 67 (22 Oct 2023 11:21)  BP: 153/62 (22 Oct 2023 11:21)  RR: 18 (22 Oct 2023 11:21)  SpO2: 95% (22 Oct 2023 11:21) (94% - 96%)  temp max in last 48H T(F): , Max: 98.7 (10-21-23 @ 05:12)    Current Antibiotics:  ceFAZolin  Injectable. 2000 milliGRAM(s) IV Push every 8 hours    Other medications:  atorvastatin 40 milliGRAM(s) Oral at bedtime  enoxaparin Injectable 40 milliGRAM(s) SubCutaneous every 24 hours  folic acid 1 milliGRAM(s) Oral daily  influenza  Vaccine (HIGH DOSE) 0.7 milliLiter(s) IntraMuscular once  multivitamin 1 Tablet(s) Oral daily  thiamine 100 milliGRAM(s) Oral daily                            14.8   8.25  )-----------( 114      ( 22 Oct 2023 06:00 )             45.2     10-22    142  |  106  |  12.7  ----------------------------<  100<H>  4.2   |  24.0  |  0.76    Ca    9.0      22 Oct 2023 06:00  Phos  3.4     10-22  Mg     2.3     10-22    TPro  6.3<L>  /  Alb  3.9  /  TBili  0.4  /  DBili  x   /  AST  23  /  ALT  20  /  AlkPhos  72  10-22    RECENT CULTURES:  10-20 @ 05:30 .Blood Blood-Peripheral     No growth at 48 Hours      10-20 @ 05:00 .Blood Blood-Peripheral     No growth at 48 Hours    WBC Count: 8.25 K/uL (10-22-23 @ 06:00)  WBC Count: 7.85 K/uL (10-21-23 @ 05:30)  WBC Count: 10.99 K/uL (10-20-23 @ 05:30)    Creatinine: 0.76 mg/dL (10-22-23 @ 06:00)  Creatinine: 0.69 mg/dL (10-21-23 @ 18:35)  Creatinine: 0.64 mg/dL (10-20-23 @ 05:30)    _______________________________________________________________  RADIOLOGY  < from: CT Upper Extremity w/ IV Cont, Right (10.20.23 @ 06:30) >    FINDINGS:    OSSEOUS STRUCTURES    Fractures:  None.    RIGHT ELBOW JOINTS    Joint Space(s):  Maintained.    Effusion:  None.    RIGHT WRIST/HAND JOINTS    Joint Space(s):  Mild ulnar positive variance is present with adjacent   subchondral cystic change of the proximal lunate suggesting ulnar   abutment. Remaining joint spaces appear maintained.    MYOTENDINOUS STRUCTURES  Intact within limits of CT.  Accessory anconeus epitrochlearis muscle is noted.    NEUROVASCULAR STRUCTURES  Unremarkable within limits of CT.    OTHER SOFT TISSUES  There is mild to moderate subcutaneous edema involving the dorsum of the   forearm with more severe edema of the wrist and hand. Cutaneous   irregularity or blistering is seen along the dorsum of the distal forearm   and wrist. No abscess or gas is seen.    IMPRESSION:  1.  Subcutaneous edema suggests cellulitis with the given history without   appreciated abscess or gas.    < end of copied text >

## 2023-10-22 NOTE — PROGRESS NOTE ADULT - SUBJECTIVE AND OBJECTIVE BOX
Patient is a 73y old  Female who presents with a chief complaint of Right hand cellulitis (21 Oct 2023 14:50)      Patient seen and examined at bedside. No overnight events reported.     ALLERGIES:  adhesives (Rash)  No Known Drug Allergies    MEDICATIONS  (STANDING):  atorvastatin 40 milliGRAM(s) Oral at bedtime  ceFAZolin  Injectable. 2000 milliGRAM(s) IV Push every 8 hours  enoxaparin Injectable 40 milliGRAM(s) SubCutaneous every 24 hours  folic acid 1 milliGRAM(s) Oral daily  multivitamin 1 Tablet(s) Oral daily  thiamine 100 milliGRAM(s) Oral daily    MEDICATIONS  (PRN):  acetaminophen     Tablet .. 650 milliGRAM(s) Oral every 6 hours PRN Temp greater or equal to 38C (100.4F), Mild Pain (1 - 3)  ketorolac   Injectable 15 milliGRAM(s) IV Push every 6 hours PRN Moderate Pain (4 - 6)  LORazepam   Injectable 1 milliGRAM(s) IV Push every 1 hour PRN CIWA-Ar score 8 or greater  melatonin 3 milliGRAM(s) Oral at bedtime PRN Insomnia  ondansetron Injectable 4 milliGRAM(s) IV Push every 8 hours PRN Nausea and/or Vomiting    Vital Signs Last 24 Hrs  T(F): 97.9 (22 Oct 2023 04:38), Max: 98.4 (21 Oct 2023 15:05)  HR: 66 (22 Oct 2023 04:38) (62 - 68)  BP: 130/72 (22 Oct 2023 04:38) (130/72 - 152/72)  RR: 18 (22 Oct 2023 04:38) (16 - 18)  SpO2: 94% (22 Oct 2023 04:38) (94% - 96%)  I&O's Summary    PHYSICAL EXAM:  General: NAD, A/O x 3  ENT: No gross hearing impairment, Moist mucous membranes, no thrush  Neck: Supple, No JVD  Lungs: Clear to auscultation bilaterally, good air entry, non-labored breathing  Cardio: RRR, S1/S2, No murmur  Abdomen: Soft, Nontender, Nondistended; Bowel sounds present  Extremities: No calf tenderness, No cyanosis, No pitting edema  Psych: Appropriate mood and affect    LABS:                        14.8   8.25  )-----------( 114      ( 22 Oct 2023 06:00 )             45.2     10-22    142  |  106  |  12.7  ----------------------------<  100  4.2   |  24.0  |  0.76    Ca    9.0      22 Oct 2023 06:00  Phos  3.4     10-22  Mg     2.3     10-22    TPro  6.3  /  Alb  3.9  /  TBili  0.4  /  DBili  x   /  AST  23  /  ALT  20  /  AlkPhos  72  10-22          PT/INR - ( 20 Oct 2023 05:30 )   PT: 10.1 sec;   INR: 0.91 ratio         PTT - ( 20 Oct 2023 05:30 )  PTT:30.0 sec              05:30 - VBG - pH:       | pCO2:       | pO2:       | Lactate: 1.20     Urinalysis Basic - ( 22 Oct 2023 06:00 )    Color: x / Appearance: x / SG: x / pH: x  Gluc: 100 mg/dL / Ketone: x  / Bili: x / Urobili: x   Blood: x / Protein: x / Nitrite: x   Leuk Esterase: x / RBC: x / WBC x   Sq Epi: x / Non Sq Epi: x / Bacteria: x    Culture - Blood (collected 20 Oct 2023 05:30)  Source: .Blood Blood-Peripheral  Preliminary Report (21 Oct 2023 09:01):  No growth at 24 hours    Culture - Blood (collected 20 Oct 2023 05:00)  Source: .Blood Blood-Peripheral  Preliminary Report (21 Oct 2023 09:02):  No growth at 24 hours    RADIOLOGY & ADDITIONAL TESTS:    Care Discussed with Consultants/Other Providers:    Patient is a 73y old  Female who presents with a chief complaint of Right hand cellulitis (21 Oct 2023 14:50)    Feels better.  Decreased swelling per patient.      Patient seen and examined at bedside. No overnight events reported.     ALLERGIES:  adhesives (Rash)  No Known Drug Allergies    MEDICATIONS  (STANDING):  atorvastatin 40 milliGRAM(s) Oral at bedtime  ceFAZolin  Injectable. 2000 milliGRAM(s) IV Push every 8 hours  enoxaparin Injectable 40 milliGRAM(s) SubCutaneous every 24 hours  folic acid 1 milliGRAM(s) Oral daily  multivitamin 1 Tablet(s) Oral daily  thiamine 100 milliGRAM(s) Oral daily    MEDICATIONS  (PRN):  acetaminophen     Tablet .. 650 milliGRAM(s) Oral every 6 hours PRN Temp greater or equal to 38C (100.4F), Mild Pain (1 - 3)  ketorolac   Injectable 15 milliGRAM(s) IV Push every 6 hours PRN Moderate Pain (4 - 6)  LORazepam   Injectable 1 milliGRAM(s) IV Push every 1 hour PRN CIWA-Ar score 8 or greater  melatonin 3 milliGRAM(s) Oral at bedtime PRN Insomnia  ondansetron Injectable 4 milliGRAM(s) IV Push every 8 hours PRN Nausea and/or Vomiting    Vital Signs Last 24 Hrs  T(F): 97.9 (22 Oct 2023 04:38), Max: 98.4 (21 Oct 2023 15:05)  HR: 66 (22 Oct 2023 04:38) (62 - 68)  BP: 130/72 (22 Oct 2023 04:38) (130/72 - 152/72)  RR: 18 (22 Oct 2023 04:38) (16 - 18)  SpO2: 94% (22 Oct 2023 04:38) (94% - 96%)  I&O's Summary    PHYSICAL EXAM:  General: NAD, A/O x 3  ENT: No gross hearing impairment, Moist mucous membranes, no thrush  Neck: Supple, No JVD  Lungs: Clear to auscultation bilaterally, good air entry, non-labored breathing  Cardio: RRR, S1/S2, No murmur  Abdomen: Soft, Nontender, Nondistended; Bowel sounds present  Extremities: No calf tenderness, No cyanosis, No pitting edema, right hand swelling noted, scattered blisters around surgical site but improved per patient, no significant drainage  Psych: Appropriate mood and affect    LABS:                        14.8   8.25  )-----------( 114      ( 22 Oct 2023 06:00 )             45.2     10-22    142  |  106  |  12.7  ----------------------------<  100  4.2   |  24.0  |  0.76    Ca    9.0      22 Oct 2023 06:00  Phos  3.4     10-22  Mg     2.3     10-22    TPro  6.3  /  Alb  3.9  /  TBili  0.4  /  DBili  x   /  AST  23  /  ALT  20  /  AlkPhos  72  10-22          PT/INR - ( 20 Oct 2023 05:30 )   PT: 10.1 sec;   INR: 0.91 ratio         PTT - ( 20 Oct 2023 05:30 )  PTT:30.0 sec              05:30 - VBG - pH:       | pCO2:       | pO2:       | Lactate: 1.20     Urinalysis Basic - ( 22 Oct 2023 06:00 )    Color: x / Appearance: x / SG: x / pH: x  Gluc: 100 mg/dL / Ketone: x  / Bili: x / Urobili: x   Blood: x / Protein: x / Nitrite: x   Leuk Esterase: x / RBC: x / WBC x   Sq Epi: x / Non Sq Epi: x / Bacteria: x    Culture - Blood (collected 20 Oct 2023 05:30)  Source: .Blood Blood-Peripheral  Preliminary Report (21 Oct 2023 09:01):  No growth at 24 hours    Culture - Blood (collected 20 Oct 2023 05:00)  Source: .Blood Blood-Peripheral  Preliminary Report (21 Oct 2023 09:02):  No growth at 24 hours    RADIOLOGY & ADDITIONAL TESTS:    Care Discussed with Consultants/Other Providers:

## 2023-10-22 NOTE — PATIENT PROFILE ADULT - PACKS YRS CALCULATION
5 Bactrim Counseling:  I discussed with the patient the risks of sulfa antibiotics including but not limited to GI upset, allergic reaction, drug rash, diarrhea, dizziness, photosensitivity, and yeast infections.  Rarely, more serious reactions can occur including but not limited to aplastic anemia, agranulocytosis, methemoglobinemia, blood dyscrasias, liver or kidney failure, lung infiltrates or desquamative/blistering drug rashes. Dapsone Pregnancy And Lactation Text: This medication is Pregnancy Category C and is not considered safe during pregnancy or breast feeding. Tazorac Counseling:  Patient advised that medication is irritating and drying.  Patient may need to apply sparingly and wash off after an hour before eventually leaving it on overnight.  The patient verbalized understanding of the proper use and possible adverse effects of tazorac.  All of the patient's questions and concerns were addressed. Isotretinoin Counseling: Patient should get monthly blood tests, not donate blood, not drive at night if vision affected, not share medication, and not undergo elective surgery for 6 months after tx completed. Side effects reviewed, pt to contact office should one occur. Erythromycin Pregnancy And Lactation Text: This medication is Pregnancy Category B and is considered safe during pregnancy. It is also excreted in breast milk. Topical Retinoid Pregnancy And Lactation Text: This medication is Pregnancy Category C. It is unknown if this medication is excreted in breast milk. Topical Sulfur Applications Counseling: Topical Sulfur Counseling: Patient counseled that this medication may cause skin irritation or allergic reactions.  In the event of skin irritation, the patient was advised to reduce the amount of the drug applied or use it less frequently.   The patient verbalized understanding of the proper use and possible adverse effects of topical sulfur application.  All of the patient's questions and concerns were addressed. Minocycline Counseling: Patient advised regarding possible photosensitivity and discoloration of the teeth, skin, lips, tongue and gums.  Patient instructed to avoid sunlight, if possible.  When exposed to sunlight, patients should wear protective clothing, sunglasses, and sunscreen.  The patient was instructed to call the office immediately if the following severe adverse effects occur:  hearing changes, easy bruising/bleeding, severe headache, or vision changes.  The patient verbalized understanding of the proper use and possible adverse effects of minocycline.  All of the patient's questions and concerns were addressed. Tetracycline Pregnancy And Lactation Text: This medication is Pregnancy Category D and not consider safe during pregnancy. It is also excreted in breast milk. Benzoyl Peroxide Counseling: Patient counseled that medicine may cause skin irritation and bleach clothing.  In the event of skin irritation, the patient was advised to reduce the amount of the drug applied or use it less frequently.   The patient verbalized understanding of the proper use and possible adverse effects of benzoyl peroxide.  All of the patient's questions and concerns were addressed. Doxycycline Counseling:  Patient counseled regarding possible photosensitivity and increased risk for sunburn.  Patient instructed to avoid sunlight, if possible.  When exposed to sunlight, patients should wear protective clothing, sunglasses, and sunscreen.  The patient was instructed to call the office immediately if the following severe adverse effects occur:  hearing changes, easy bruising/bleeding, severe headache, or vision changes.  The patient verbalized understanding of the proper use and possible adverse effects of doxycycline.  All of the patient's questions and concerns were addressed. Isotretinoin Pregnancy And Lactation Text: This medication is Pregnancy Category X and is considered extremely dangerous during pregnancy. It is unknown if it is excreted in breast milk. Tazorac Pregnancy And Lactation Text: This medication is not safe during pregnancy. It is unknown if this medication is excreted in breast milk. Spironolactone Counseling: Patient advised regarding risks of diarrhea, abdominal pain, hyperkalemia, birth defects (for female patients), liver toxicity and renal toxicity. The patient may need blood work to monitor liver and kidney function and potassium levels while on therapy. The patient verbalized understanding of the proper use and possible adverse effects of spironolactone.  All of the patient's questions and concerns were addressed. Topical Sulfur Applications Pregnancy And Lactation Text: This medication is Pregnancy Category C and has an unknown safety profile during pregnancy. It is unknown if this topical medication is excreted in breast milk. Bactrim Pregnancy And Lactation Text: This medication is Pregnancy Category D and is known to cause fetal risk.  It is also excreted in breast milk. Include Pregnancy/Lactation Warning?: No High Dose Vitamin A Counseling: Side effects reviewed, pt to contact office should one occur. Topical Clindamycin Counseling: Patient counseled that this medication may cause skin irritation or allergic reactions.  In the event of skin irritation, the patient was advised to reduce the amount of the drug applied or use it less frequently.   The patient verbalized understanding of the proper use and possible adverse effects of clindamycin.  All of the patient's questions and concerns were addressed. Spironolactone Pregnancy And Lactation Text: This medication can cause feminization of the male fetus and should be avoided during pregnancy. The active metabolite is also found in breast milk. Birth Control Pills Counseling: Birth Control Pill Counseling: I discussed with the patient the potential side effects of OCPs including but not limited to increased risk of stroke, heart attack, thrombophlebitis, deep venous thrombosis, hepatic adenomas, breast changes, GI upset, headaches, and depression.  The patient verbalized understanding of the proper use and possible adverse effects of OCPs. All of the patient's questions and concerns were addressed. Doxycycline Pregnancy And Lactation Text: This medication is Pregnancy Category D and not consider safe during pregnancy. It is also excreted in breast milk but is considered safe for shorter treatment courses. Benzoyl Peroxide Pregnancy And Lactation Text: This medication is Pregnancy Category C. It is unknown if benzoyl peroxide is excreted in breast milk. Tetracycline Counseling: Patient counseled regarding possible photosensitivity and increased risk for sunburn.  Patient instructed to avoid sunlight, if possible.  When exposed to sunlight, patients should wear protective clothing, sunglasses, and sunscreen.  The patient was instructed to call the office immediately if the following severe adverse effects occur:  hearing changes, easy bruising/bleeding, severe headache, or vision changes.  The patient verbalized understanding of the proper use and possible adverse effects of tetracycline.  All of the patient's questions and concerns were addressed. Patient understands to avoid pregnancy while on therapy due to potential birth defects. Detail Level: Detailed Azithromycin Pregnancy And Lactation Text: This medication is considered safe during pregnancy and is also secreted in breast milk. Dapsone Counseling: I discussed with the patient the risks of dapsone including but not limited to hemolytic anemia, agranulocytosis, rashes, methemoglobinemia, kidney failure, peripheral neuropathy, headaches, GI upset, and liver toxicity.  Patients who start dapsone require monitoring including baseline LFTs and weekly CBCs for the first month, then every month thereafter.  The patient verbalized understanding of the proper use and possible adverse effects of dapsone.  All of the patient's questions and concerns were addressed. Azithromycin Counseling:  I discussed with the patient the risks of azithromycin including but not limited to GI upset, allergic reaction, drug rash, diarrhea, and yeast infections. Birth Control Pills Pregnancy And Lactation Text: This medication should be avoided if pregnant and for the first 30 days post-partum. Erythromycin Counseling:  I discussed with the patient the risks of erythromycin including but not limited to GI upset, allergic reaction, drug rash, diarrhea, increase in liver enzymes, and yeast infections. Topical Retinoid counseling:  Patient advised to apply a pea-sized amount only at bedtime and wait 30 minutes after washing their face before applying.  If too drying, patient may add a non-comedogenic moisturizer. The patient verbalized understanding of the proper use and possible adverse effects of retinoids.  All of the patient's questions and concerns were addressed. High Dose Vitamin A Pregnancy And Lactation Text: High dose vitamin A therapy is contraindicated during pregnancy and breast feeding. Topical Clindamycin Pregnancy And Lactation Text: This medication is Pregnancy Category B and is considered safe during pregnancy. It is unknown if it is excreted in breast milk.

## 2023-10-22 NOTE — PATIENT PROFILE ADULT - IS PATIENT POST-MENOPAUSAL?
SERGIO PHILLIP  ----------------------------------------  The patient was seen and evaluated for hypertensive urgency. Offers no complaints. Reports feeling well.    Vital Signs Last 24 Hrs  T(C): 36.7 (27 Oct 2020 11:00), Max: 37.4 (26 Oct 2020 22:10)  T(F): 98.1 (27 Oct 2020 11:00), Max: 99.4 (26 Oct 2020 22:10)  HR: 79 (27 Oct 2020 11:00) (72 - 79)  BP: 106/62 (27 Oct 2020 11:00) (106/62 - 175/95)  BP(mean): --  RR: 18 (27 Oct 2020 11:00) (18 - 18)  SpO2: 96% (27 Oct 2020 11:00) (94% - 96%)    CAPILLARY BLOOD GLUCOSE  POCT Blood Glucose.: 125 mg/dL (27 Oct 2020 08:30)  POCT Blood Glucose.: 174 mg/dL (26 Oct 2020 21:42)  POCT Blood Glucose.: 294 mg/dL (26 Oct 2020 17:10)  POCT Blood Glucose.: 240 mg/dL (26 Oct 2020 14:53)  POCT Blood Glucose.: 80 mg/dL (26 Oct 2020 12:11)    PHYSICAL EXAMINATION:  ----------------------------------------  General appearance: No acute distress, Awake, Alert  HEENT: Normocephalic, Atraumatic, Conjunctiva clear, EOMI  Neck: Supple, No JVD, No tenderness  Lungs: Breath sound equal bilaterally, No wheezes, No rales  Cardiovascular: S1S2, Regular rhythm  Abdomen: Soft, Nontender, Nondistended, No guarding/rebound, Positive bowel sounds  Extremities: No clubbing, No cyanosis, No edema, No calf tenderness  Neuro: Strength equal bilaterally, No tremors  Psychiatric: Appropriate mood, Normal affect    LABORATORY STUDIES:  ----------------------------------------  10-27    140  |  105  |  18.0  ----------------------------<  101<H>  3.9   |  23.0  |  1.01    Ca    8.7      27 Oct 2020 06:48    MEDICATIONS  (STANDING):  amLODIPine   Tablet 10 milliGRAM(s) Oral daily  aspirin  chewable 81 milliGRAM(s) Oral daily  atorvastatin 40 milliGRAM(s) Oral at bedtime  dextrose 5%. 1000 milliLiter(s) (50 mL/Hr) IV Continuous <Continuous>  dextrose 50% Injectable 12.5 Gram(s) IV Push once  dextrose 50% Injectable 25 Gram(s) IV Push once  dextrose 50% Injectable 25 Gram(s) IV Push once  enoxaparin Injectable 40 milliGRAM(s) SubCutaneous daily  gabapentin 100 milliGRAM(s) Oral three times a day  hydrALAZINE 50 milliGRAM(s) Oral every 8 hours  insulin glargine Injectable (LANTUS) 15 Unit(s) SubCutaneous at bedtime  insulin lispro (ADMELOG) corrective regimen sliding scale   SubCutaneous three times a day before meals  insulin lispro (ADMELOG) corrective regimen sliding scale   SubCutaneous at bedtime  insulin lispro Injectable (ADMELOG) 8 Unit(s) SubCutaneous three times a day before meals  isosorbide   mononitrate ER Tablet (IMDUR) 30 milliGRAM(s) Oral daily  levothyroxine 137 MICROGram(s) Oral daily  losartan 100 milliGRAM(s) Oral daily  senna 1 Tablet(s) Oral at bedtime    MEDICATIONS  (PRN):  dextrose 40% Gel 15 Gram(s) Oral once PRN Blood Glucose LESS THAN 70 milliGRAM(s)/deciliter  glucagon  Injectable 1 milliGRAM(s) IntraMuscular once PRN Glucose LESS THAN 70 milligrams/deciliter  ondansetron Injectable 4 milliGRAM(s) IV Push every 6 hours PRN Nausea and/or Vomiting      ASSESSMENT / PLAN:  ----------------------------------------  69F with a history of coronary artery disease, CABG, hypertension, diabetes, and hypothyroidism who initially presented to the hospital for a nuclear stress test due to dyspnea on exertion. During the stress test, the patient was noted to have elevated blood pressures(200/110) and difficulty with speech and slow response to questions. A rapid response was activated and the patient transferred to the emergency department for evaluation.    Hypertensive emergency - Blood pressure improved. On amlodipine, hydralazine, isosorbide, and losartan. Carvedilol previously discontinued. On telemetry.    Slurred speech and confusion - CT of the head was without acute intracranial pathology. Awaiting MRI to further assess. No neurological deficits on examination. Neurology follow up noted, discussed.    Chest pain - Nuclear stress test results noted small mild reversible defects in mid anterior and mid anteroseptal walls as well as medium sized moderate defects in basal, mid inferior, basal and mid infero-lateral walls. Planned for eventual cardiac catheterization. On aspirin and atorvastatin.    Diabetes - Insulin coverage, close monitoring of blood glucose levels.    Hypothyroidism - On levothyroxine. SERGIO PHILLIP  ----------------------------------------  The patient was seen and evaluated for hypertensive urgency. Offers no complaints. Reports feeling well.    Vital Signs Last 24 Hrs  T(C): 36.7 (27 Oct 2020 11:00), Max: 37.4 (26 Oct 2020 22:10)  T(F): 98.1 (27 Oct 2020 11:00), Max: 99.4 (26 Oct 2020 22:10)  HR: 79 (27 Oct 2020 11:00) (72 - 79)  BP: 106/62 (27 Oct 2020 11:00) (106/62 - 175/95)  BP(mean): --  RR: 18 (27 Oct 2020 11:00) (18 - 18)  SpO2: 96% (27 Oct 2020 11:00) (94% - 96%)    CAPILLARY BLOOD GLUCOSE  POCT Blood Glucose.: 125 mg/dL (27 Oct 2020 08:30)  POCT Blood Glucose.: 174 mg/dL (26 Oct 2020 21:42)  POCT Blood Glucose.: 294 mg/dL (26 Oct 2020 17:10)  POCT Blood Glucose.: 240 mg/dL (26 Oct 2020 14:53)  POCT Blood Glucose.: 80 mg/dL (26 Oct 2020 12:11)    PHYSICAL EXAMINATION:  ----------------------------------------  General appearance: No acute distress, Awake, Alert  HEENT: Normocephalic, Atraumatic, Conjunctiva clear, EOMI  Neck: Supple, No JVD, No tenderness  Lungs: Breath sound equal bilaterally, No wheezes, No rales  Cardiovascular: S1S2, Regular rhythm  Abdomen: Soft, Nontender, Nondistended, No guarding/rebound, Positive bowel sounds  Extremities: No clubbing, No cyanosis, No edema, No calf tenderness  Neuro: Strength equal bilaterally, No tremors  Psychiatric: Appropriate mood, Normal affect    LABORATORY STUDIES:  ----------------------------------------  10-27    140  |  105  |  18.0  ----------------------------<  101<H>  3.9   |  23.0  |  1.01    Ca    8.7      27 Oct 2020 06:48    MEDICATIONS  (STANDING):  amLODIPine   Tablet 10 milliGRAM(s) Oral daily  aspirin  chewable 81 milliGRAM(s) Oral daily  atorvastatin 40 milliGRAM(s) Oral at bedtime  dextrose 5%. 1000 milliLiter(s) (50 mL/Hr) IV Continuous <Continuous>  dextrose 50% Injectable 12.5 Gram(s) IV Push once  dextrose 50% Injectable 25 Gram(s) IV Push once  dextrose 50% Injectable 25 Gram(s) IV Push once  enoxaparin Injectable 40 milliGRAM(s) SubCutaneous daily  gabapentin 100 milliGRAM(s) Oral three times a day  hydrALAZINE 50 milliGRAM(s) Oral every 8 hours  insulin glargine Injectable (LANTUS) 15 Unit(s) SubCutaneous at bedtime  insulin lispro (ADMELOG) corrective regimen sliding scale   SubCutaneous three times a day before meals  insulin lispro (ADMELOG) corrective regimen sliding scale   SubCutaneous at bedtime  insulin lispro Injectable (ADMELOG) 8 Unit(s) SubCutaneous three times a day before meals  isosorbide   mononitrate ER Tablet (IMDUR) 30 milliGRAM(s) Oral daily  levothyroxine 137 MICROGram(s) Oral daily  losartan 100 milliGRAM(s) Oral daily  senna 1 Tablet(s) Oral at bedtime    MEDICATIONS  (PRN):  dextrose 40% Gel 15 Gram(s) Oral once PRN Blood Glucose LESS THAN 70 milliGRAM(s)/deciliter  glucagon  Injectable 1 milliGRAM(s) IntraMuscular once PRN Glucose LESS THAN 70 milligrams/deciliter  ondansetron Injectable 4 milliGRAM(s) IV Push every 6 hours PRN Nausea and/or Vomiting      ASSESSMENT / PLAN:  ----------------------------------------  69F with a history of coronary artery disease, CABG, hypertension, diabetes, and hypothyroidism who initially presented to the hospital for a nuclear stress test due to dyspnea on exertion. During the stress test, the patient was noted to have elevated blood pressures(200/110) and difficulty with speech and slow response to questions. A rapid response was activated and the patient transferred to the emergency department for evaluation.    Hypertensive emergency - Blood pressure improved. On amlodipine, hydralazine, isosorbide, and losartan. Carvedilol previously discontinued. On telemetry.    Slurred speech and confusion - CT of the head was without acute intracranial pathology. Awaiting MRI to further assess. No neurological deficits on examination. Neurology follow up noted, discussed.    Chest pain - Nuclear stress test results noted small mild reversible defects in mid anterior and mid anteroseptal walls as well as medium sized moderate defects in basal, mid inferior, basal and mid infero-lateral walls. Planned for eventual cardiac catheterization. On aspirin and atorvastatin.    Diabetes - Insulin coverage, close monitoring of blood glucose levels. Neuropathy improved with gabapentin.    Hypothyroidism - On levothyroxine. yes

## 2023-10-23 ENCOUNTER — NON-APPOINTMENT (OUTPATIENT)
Age: 73
End: 2023-10-23

## 2023-10-23 ENCOUNTER — TRANSCRIPTION ENCOUNTER (OUTPATIENT)
Age: 73
End: 2023-10-23

## 2023-10-23 VITALS
RESPIRATION RATE: 18 BRPM | OXYGEN SATURATION: 98 % | HEART RATE: 62 BPM | SYSTOLIC BLOOD PRESSURE: 151 MMHG | TEMPERATURE: 98 F | DIASTOLIC BLOOD PRESSURE: 54 MMHG

## 2023-10-23 PROCEDURE — 99232 SBSQ HOSP IP/OBS MODERATE 35: CPT

## 2023-10-23 PROCEDURE — 99239 HOSP IP/OBS DSCHRG MGMT >30: CPT

## 2023-10-23 RX ORDER — CEFUROXIME AXETIL 250 MG
1 TABLET ORAL
Qty: 21 | Refills: 0
Start: 2023-10-23 | End: 2023-10-29

## 2023-10-23 RX ORDER — CEFUROXIME AXETIL 250 MG
1 TABLET ORAL
Qty: 14 | Refills: 0
Start: 2023-10-23 | End: 2023-10-29

## 2023-10-23 RX ADMIN — Medication 2000 MILLIGRAM(S): at 05:18

## 2023-10-23 NOTE — SBIRT NOTE ADULT - NSSBIRTDRGNOACTINTDET_GEN_A_CORE
SW met w/ pt at bedside and completed SBIRT. Pt drinks 1 to 2 glasses of wine occassionally w/ dinner. Pt has no past or current ETOH abuse and or substance abuse hx, declines all resources. SBIRT being closed at this time. No SW needs are identified at this time.

## 2023-10-23 NOTE — DISCHARGE NOTE NURSING/CASE MANAGEMENT/SOCIAL WORK - PATIENT PORTAL LINK FT
You can access the FollowMyHealth Patient Portal offered by [facility] by registering at the following website: [email or web address]. By joining [facility]’s FollowMyHealth portal, you will also be able to view your health information using other applications (apps) compatible with our system.

## 2023-10-23 NOTE — PROGRESS NOTE ADULT - SUBJECTIVE AND OBJECTIVE BOX
Patient is a 73y old  Female who presents with a chief complaint of Right hand cellulitis (22 Oct 2023 07:34)      Patient seen and examined at bedside. No overnight events reported.     ALLERGIES:  adhesives (Rash)  No Known Drug Allergies    MEDICATIONS  (STANDING):  atorvastatin 40 milliGRAM(s) Oral at bedtime  ceFAZolin  Injectable. 2000 milliGRAM(s) IV Push every 8 hours  enoxaparin Injectable 40 milliGRAM(s) SubCutaneous every 24 hours  folic acid 1 milliGRAM(s) Oral daily  influenza  Vaccine (HIGH DOSE) 0.7 milliLiter(s) IntraMuscular once  multivitamin 1 Tablet(s) Oral daily  thiamine 100 milliGRAM(s) Oral daily    MEDICATIONS  (PRN):  acetaminophen     Tablet .. 650 milliGRAM(s) Oral every 6 hours PRN Temp greater or equal to 38C (100.4F), Mild Pain (1 - 3)  ketorolac   Injectable 15 milliGRAM(s) IV Push every 6 hours PRN Moderate Pain (4 - 6)  LORazepam   Injectable 1 milliGRAM(s) IV Push every 1 hour PRN CIWA-Ar score 8 or greater  melatonin 3 milliGRAM(s) Oral at bedtime PRN Insomnia  ondansetron Injectable 4 milliGRAM(s) IV Push every 8 hours PRN Nausea and/or Vomiting    Vital Signs Last 24 Hrs  T(F): 97.6 (23 Oct 2023 04:41), Max: 98 (22 Oct 2023 11:21)  HR: 64 (23 Oct 2023 04:41) (64 - 69)  BP: 110/57 (23 Oct 2023 04:41) (110/57 - 153/62)  RR: 18 (23 Oct 2023 04:41) (18 - 18)  SpO2: 96% (23 Oct 2023 04:41) (95% - 99%)  I&O's Summary    22 Oct 2023 07:01  -  23 Oct 2023 07:00  --------------------------------------------------------  IN: 2400 mL / OUT: 0 mL / NET: 2400 mL      PHYSICAL EXAM:  General: NAD, A/O x 3  ENT: No gross hearing impairment, Moist mucous membranes, no thrush  Neck: Supple, No JVD  Lungs: Clear to auscultation bilaterally, good air entry, non-labored breathing  Cardio: RRR, S1/S2, No murmur  Abdomen: Soft, Nontender, Nondistended; Bowel sounds present  Extremities: No calf tenderness, No cyanosis, No pitting edema, decreased swelling, improve tenderness on right hand  Psych: Appropriate mood and affect    LABS:                        14.8   8.25  )-----------( 114      ( 22 Oct 2023 06:00 )             45.2     10-22    142  |  106  |  12.7  ----------------------------<  100  4.2   |  24.0  |  0.76    Ca    9.0      22 Oct 2023 06:00  Phos  3.4     10-22  Mg     2.3     10-22    TPro  6.3  /  Alb  3.9  /  TBili  0.4  /  DBili  x   /  AST  23  /  ALT  20  /  AlkPhos  72  10-22    Urinalysis Basic - ( 22 Oct 2023 06:00 )    Color: x / Appearance: x / SG: x / pH: x  Gluc: 100 mg/dL / Ketone: x  / Bili: x / Urobili: x   Blood: x / Protein: x / Nitrite: x   Leuk Esterase: x / RBC: x / WBC x   Sq Epi: x / Non Sq Epi: x / Bacteria: x    Culture - Blood (collected 20 Oct 2023 05:30)  Source: .Blood Blood-Peripheral  Preliminary Report (22 Oct 2023 09:01):    No growth at 48 Hours    Culture - Blood (collected 20 Oct 2023 05:00)  Source: .Blood Blood-Peripheral  Preliminary Report (22 Oct 2023 09:01):    No growth at 48 Hours        RADIOLOGY & ADDITIONAL TESTS:    Care Discussed with Consultants/Other Providers:    Patient is a 73y old  Female who presents with a chief complaint of Right hand cellulitis (22 Oct 2023 07:34)      Patient seen and examined at bedside. No overnight events reported.     ALLERGIES:  adhesives (Rash)  No Known Drug Allergies    MEDICATIONS  (STANDING):  atorvastatin 40 milliGRAM(s) Oral at bedtime  ceFAZolin  Injectable. 2000 milliGRAM(s) IV Push every 8 hours  enoxaparin Injectable 40 milliGRAM(s) SubCutaneous every 24 hours  folic acid 1 milliGRAM(s) Oral daily  influenza  Vaccine (HIGH DOSE) 0.7 milliLiter(s) IntraMuscular once  multivitamin 1 Tablet(s) Oral daily  thiamine 100 milliGRAM(s) Oral daily    MEDICATIONS  (PRN):  acetaminophen     Tablet .. 650 milliGRAM(s) Oral every 6 hours PRN Temp greater or equal to 38C (100.4F), Mild Pain (1 - 3)  ketorolac   Injectable 15 milliGRAM(s) IV Push every 6 hours PRN Moderate Pain (4 - 6)  LORazepam   Injectable 1 milliGRAM(s) IV Push every 1 hour PRN CIWA-Ar score 8 or greater  melatonin 3 milliGRAM(s) Oral at bedtime PRN Insomnia  ondansetron Injectable 4 milliGRAM(s) IV Push every 8 hours PRN Nausea and/or Vomiting    Vital Signs Last 24 Hrs  T(F): 97.6 (23 Oct 2023 04:41), Max: 98 (22 Oct 2023 11:21)  HR: 64 (23 Oct 2023 04:41) (64 - 69)  BP: 110/57 (23 Oct 2023 04:41) (110/57 - 153/62)  RR: 18 (23 Oct 2023 04:41) (18 - 18)  SpO2: 96% (23 Oct 2023 04:41) (95% - 99%)  I&O's Summary    22 Oct 2023 07:01  -  23 Oct 2023 07:00  --------------------------------------------------------  IN: 2400 mL / OUT: 0 mL / NET: 2400 mL      PHYSICAL EXAM:  General: NAD, A/O x 3  ENT: No gross hearing impairment, Moist mucous membranes, no thrush  Neck: Supple, No JVD  Lungs: Clear to auscultation bilaterally, good air entry, non-labored breathing  Cardio: RRR, S1/S2, No murmur  Abdomen: Soft, Nontender, Nondistended; Bowel sounds present  Extremities: No calf tenderness, No cyanosis, No pitting edema, decreased swelling, improve tenderness on right hand, increased ROM, blistering bubbles noted around surgical incision site but improved   Psych: Appropriate mood and affect    LABS:                        14.8   8.25  )-----------( 114      ( 22 Oct 2023 06:00 )             45.2     10-22    142  |  106  |  12.7  ----------------------------<  100  4.2   |  24.0  |  0.76    Ca    9.0      22 Oct 2023 06:00  Phos  3.4     10-22  Mg     2.3     10-22    TPro  6.3  /  Alb  3.9  /  TBili  0.4  /  DBili  x   /  AST  23  /  ALT  20  /  AlkPhos  72  10-22    Urinalysis Basic - ( 22 Oct 2023 06:00 )    Color: x / Appearance: x / SG: x / pH: x  Gluc: 100 mg/dL / Ketone: x  / Bili: x / Urobili: x   Blood: x / Protein: x / Nitrite: x   Leuk Esterase: x / RBC: x / WBC x   Sq Epi: x / Non Sq Epi: x / Bacteria: x    Culture - Blood (collected 20 Oct 2023 05:30)  Source: .Blood Blood-Peripheral  Preliminary Report (22 Oct 2023 09:01):    No growth at 48 Hours    Culture - Blood (collected 20 Oct 2023 05:00)  Source: .Blood Blood-Peripheral  Preliminary Report (22 Oct 2023 09:01):    No growth at 48 Hours        RADIOLOGY & ADDITIONAL TESTS:    Care Discussed with Consultants/Other Providers:

## 2023-10-23 NOTE — DISCHARGE NOTE PROVIDER - NSDCCPCAREPLAN_GEN_ALL_CORE_FT
PRINCIPAL DISCHARGE DIAGNOSIS  Diagnosis: Cellulitis of hand  Assessment and Plan of Treatment: You were admitted for Right hand cellulitis.   You were treated with IV antibiotics and local wound care with improvement of your symptoms.    You were prescribed the following new medications:  Ceftin 500 mg twice a day for 7 days  You will need to follow up with your primary care physician.  Discharging Provider:  Sloan Wagner MD  Contact Info: Cell [phone] - Please call with any questions or concerns.

## 2023-10-23 NOTE — OCCUPATIONAL THERAPY INITIAL EVALUATION ADULT - PERTINENT HX OF CURRENT PROBLEM, REHAB EVAL
73y F w/ hx HLD, squamous cell carcinoma s/p recent Mohs surgery on Wednesday presented with complaints of  R wrist/forearm pain and swelling.  Patient reports progressively worsening swelling and pain of the hand since the surgery. She reports that she tried to speak to her Dermatologist on Thursday but was not able to be seen and was advised to go to ED if worsening.. Seen by Hand surgery in ED, who recommended IV abx and hand elevation with no acute surgical interventions. CT of hand showed cellulitis without any abscess collection.

## 2023-10-23 NOTE — DISCHARGE NOTE PROVIDER - HOSPITAL COURSE
Hospital Course  HPI:  73y F w/ hx HLD, squamous cell carcinoma s/p recent Mohs surgery on Wednesday presented with complaints of  R wrist/forearm pain and swelling.  Patient reports progressively worsening swelling and pain of the hand since the surgery. She reports that she tried to speak to her Dermatologist on Thursday but was not able to be seen and was advised to go to ED if worsening.. Seen by Hand surgery in ED, who recommended IV abx and hand elevation with no acute surgical interventions. CT of hand showed cellulitis without any abscess collection. ED discussed with patients Dermatologist who stated that swelling can be post operatively but advised to treat as cellulitis. Patient placed in observation and then reported no improvement after 24 hours and advised admission to medicine. ID was consulted by ED. Patient admitted to medicine for further management of right hand cellulitis.      (21 Oct 2023 10:11)    You were admitted for Right hand cellulitis.     You were treated with IV antibiotics and local wound care with improvement of your symptoms.      You were prescribed the following new medications:  Ceftin 500 mg twice a day for 7 days    You will need to follow up with your primary care physician.    Discharging Provider:  Sloan Wagner MD  Contact Info: Cell [phone] - Please call with any questions or concerns.     Hospital Course  HPI:  73y F w/ hx HLD, squamous cell carcinoma s/p recent Mohs surgery on Wednesday presented with complaints of  R wrist/forearm pain and swelling.  Patient reports progressively worsening swelling and pain of the hand since the surgery. She reports that she tried to speak to her Dermatologist on Thursday but was not able to be seen and was advised to go to ED if worsening.. Seen by Hand surgery in ED, who recommended IV abx and hand elevation with no acute surgical interventions. CT of hand showed cellulitis without any abscess collection. ED discussed with patients Dermatologist who stated that swelling can be post operatively but advised to treat as cellulitis. Patient placed in observation and then reported no improvement after 24 hours and advised admission to medicine. ID was consulted by ED. Patient admitted to medicine for further management of right hand cellulitis.      (21 Oct 2023 10:11)    You were admitted for Right hand cellulitis.     You were treated with IV antibiotics and local wound care with improvement of your symptoms.      You were prescribed the following new medications:  Ceftin 500 mg 3 times a day for 7 days    You will need to follow up with your primary care physician.    Cover wound with dry gauze and then kerlex wrap.  Keep wound covered until blisters burst.    Discharging Provider:  Sloan Wagner MD  Contact Info: Cell [phone] - Please call with any questions or concerns.

## 2023-10-23 NOTE — OCCUPATIONAL THERAPY INITIAL EVALUATION ADULT - GENERAL OBSERVATIONS, REHAB EVAL
pt received sitting edge of bed and left as found, c/o pain 3/10 right hand, pt pleasant, motivated and following all commands

## 2023-10-23 NOTE — DISCHARGE NOTE PROVIDER - NSDCMRMEDTOKEN_GEN_ALL_CORE_FT
atorvastatin 40 mg oral tablet: 1 tab(s) orally once a day (at bedtime)  cefuroxime 500 mg oral tablet: 1 tab(s) orally 2 times a day   atorvastatin 40 mg oral tablet: 1 tab(s) orally once a day (at bedtime)  cefuroxime 500 mg oral tablet: 1 tab(s) orally 3 times a day

## 2023-10-23 NOTE — OCCUPATIONAL THERAPY INITIAL EVALUATION ADULT - RANGE OF MOTION EXAMINATION, UPPER EXTREMITY
except wrist and digits unable to fully flex due to pain/bilateral UE Active ROM was WNL (within normal limits)

## 2023-10-23 NOTE — PROGRESS NOTE ADULT - SUBJECTIVE AND OBJECTIVE BOX
[facility]  INFECTIOUS DISEASES at [place] [facility] [place]  ===============================================================                               Gurdeep Kelly MD*     Jerri Yepez MD*                         Marisol James MD*       Racquel Echeverria MD*            Diplomates American Board of Internal Medicine & Infectious Diseases                * [place] Office - Appt - Tel  [phone] Fax [phone]                * [place] Office - Appt - Tel [phone] Fax [phone]                                  Hospital Consult line:  [phone]  ==============================================================    ALEX MAYFIELD [id number]    Follow up: right arm cellulitis post Mohs surgery     no acute events  hemodynamically stable   afebrile  For discharge today     I have personally reviewed the labs and data; pertinent labs and data are listed in this note; please see below.     _______________________________________________________________  REVIEW OF SYSTEMS  Doing well. Offers no complains. right arm much improved. almost able to make a fist. No fever, chills, nausea, vomiting or diarrhea.   ________________________________________________________________  Allergies:  adhesives (Rash)  No Known Drug Allergies    ________________________________________________________________  PHYSICAL EXAM  GEN: in NAD, lying in bed.   HEENT: Anicteric sclerae,   NECK: Supple.   LUNGS: eupneic.     : No Ervin catheter  EXTREMITIES: right arm - improved swelling and erythema, which is now only around surgical incision. Blisters unchanged. Wrist and digits ROM improving.   NEUROLOGIC: Grossly no focal deficits   SKIN: as described above   LINES: PIV  ________________________________________________________________  Vitals:  T(F): 97.9 (23 Oct 2023 11:30), Max: 98 (22 Oct 2023 16:13)  HR: 62 (23 Oct 2023 11:30)  BP: 151/54 (23 Oct 2023 11:30)  RR: 18 (23 Oct 2023 11:30)  SpO2: 98% (23 Oct 2023 11:30) (96% - 99%)  temp max in last 48H T(F): , Max: 98.4 (10-21-23 @ 15:05)    Current Antibiotics:  ceFAZolin  Injectable. 2000 milliGRAM(s) IV Push every 8 hours    Other medications:  atorvastatin 40 milliGRAM(s) Oral at bedtime  enoxaparin Injectable 40 milliGRAM(s) SubCutaneous every 24 hours  folic acid 1 milliGRAM(s) Oral daily  influenza  Vaccine (HIGH DOSE) 0.7 milliLiter(s) IntraMuscular once  multivitamin 1 Tablet(s) Oral daily  thiamine 100 milliGRAM(s) Oral daily                            14.8   8.25  )-----------( 114      ( 22 Oct 2023 06:00 )             45.2     10-22    142  |  106  |  12.7  ----------------------------<  100<H>  4.2   |  24.0  |  0.76    Ca    9.0      22 Oct 2023 06:00  Phos  3.4     10-22  Mg     2.3     10-22    TPro  6.3<L>  /  Alb  3.9  /  TBili  0.4  /  DBili  x   /  AST  23  /  ALT  20  /  AlkPhos  72  10-22    RECENT CULTURES:  10-20 @ 05:30 .Blood Blood-Peripheral     No growth at 72 Hours      10-20 @ 05:00 .Blood Blood-Peripheral     No growth at 72 Hours      WBC Count: 8.25 K/uL (10-22-23 @ 06:00)  WBC Count: 7.85 K/uL (10-21-23 @ 05:30)  WBC Count: 10.99 K/uL (10-20-23 @ 05:30)    Creatinine: 0.76 mg/dL (10-22-23 @ 06:00)  Creatinine: 0.69 mg/dL (10-21-23 @ 18:35)  Creatinine: 0.64 mg/dL (10-20-23 @ 05:30)    ________________________________________________________________  RADIOLOGY  < from: CT Upper Extremity w/ IV Cont, Right (10.20.23 @ 06:30) >    FINDINGS:    OSSEOUS STRUCTURES    Fractures:  None.    RIGHT ELBOW JOINTS    Joint Space(s):  Maintained.    Effusion:  None.    RIGHT WRIST/HAND JOINTS    Joint Space(s):  Mild ulnar positive variance is present with adjacent   subchondral cystic change of the proximal lunate suggesting ulnar   abutment. Remaining joint spaces appear maintained.    MYOTENDINOUS STRUCTURES  Intact within limits of CT.  Accessory anconeus epitrochlearis muscle is noted.    NEUROVASCULAR STRUCTURES  Unremarkable within limits of CT.    OTHER SOFT TISSUES  There is mild to moderate subcutaneous edema involving the dorsum of the   forearm with more severe edema of the wrist and hand. Cutaneous   irregularity or blistering is seen along the dorsum of the distal forearm   and wrist. No abscess or gas is seen.    IMPRESSION:  1.  Subcutaneous edema suggests cellulitis with the given history without   appreciated abscess or gas.    < end of copied text >

## 2023-10-23 NOTE — PROGRESS NOTE ADULT - ASSESSMENT
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     Right arm cellulitis   Skin SCC     - clinical improving on current regimen   - continue cefazolin at current dose   - Anticipate discharge on oral cephalexin   - Right arm elevation and local wound care to surgical site   - CT RLE with findings c/w SSTI. No collections or gas.   - BCx drawn on admission - ngtd   - MRSA nasal swab neg   - No leukocytosis     D/w Dr. Wagner   
73y F w/ hx HLD, squamous cell carcinoma s/p recent Mohs surgery on Wednesday presented with complaints of  R wrist/forearm pain and swelling.  Patient reports progressively worsening swelling and pain of the hand since the surgery. She reports that she tried to speak to her Dermatologist on Thursday but was not able to be seen and was advised to go to ED if worsening.. Seen by Hand surgery in ED, who recommended IV abx and hand elevation with no acute surgical interventions. CT of hand showed cellulitis without any abscess collection. ED discussed with patients Dermatologist who stated that swelling can be post operatively but advised to treat as cellulitis. Patient placed in observation and then reported no improvement after 24 hours and advised admission to medicine. ID was consulted by ED. Patient admitted to medicine for further management of right hand cellulitis.       Right hand cellulitis s/p recent Mohs surgery for SSC 10/18/23  - seen by hand surgery - no surgical intervention, agree with antibiotics and local wound care   - cont cefazolin  - ID following   - Arm elevation    Hyperlipidemia  Atorvastatin 40mg QHS    Current Tobacco Use   Nicotine abuse  Counseled  Refused patch    DVT prophylaxis: Lovenox    Dispo: suspect d/c today with oral antibiotics  
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     Right arm cellulitis   Skin SCC     - clinical improving on current regimen   - on cefazolin   - OK to discharge on oral cephalexin 500mg PO TID for another 7 days  - continue local wound care   - CT RLE with findings c/w SSTI. No collections or gas.   - BCx drawn on admission - ngtd   - MRSA nasal swab neg   - No leukocytosis   - Can follow up with me on 11/2. P: [phone]    D/w Dr. Wagner   
73y F w/ hx HLD, squamous cell carcinoma s/p recent Mohs surgery on Wednesday presented with complaints of  R wrist/forearm pain and swelling.  Patient reports progressively worsening swelling and pain of the hand since the surgery. She reports that she tried to speak to her Dermatologist on Thursday but was not able to be seen and was advised to go to ED if worsening.. Seen by Hand surgery in ED, who recommended IV abx and hand elevation with no acute surgical interventions. CT of hand showed cellulitis without any abscess collection. ED discussed with patients Dermatologist who stated that swelling can be post operatively but advised to treat as cellulitis. Patient placed in observation and then reported no improvement after 24 hours and advised admission to medicine. ID was consulted by ED. Patient admitted to medicine for further management of right hand cellulitis.       Right hand cellulitis s/p recent Mohs surgery for SSC 10/18/23  - seen by hand surgery - no surgical intervention, agree with antibiotics and local wound care   - cont cefazolin  - follow up ID consult   - MRSA pending  ID consulted  Arm elevation    Hyperlipidemia  Atorvastatin 40mg QHS    Current Tobacco Use   Nicotine abuse  Counseled  Refused patch    Daily Alcohol intake  Drinks 1-2 Glasses of wine every 1-2 days  Will place on CIWA symptom triggered protocol    DVT prophylaxis: Lovenox

## 2023-10-25 LAB
CULTURE RESULTS: SIGNIFICANT CHANGE UP
SPECIMEN SOURCE: SIGNIFICANT CHANGE UP

## 2023-10-30 ENCOUNTER — APPOINTMENT (OUTPATIENT)
Dept: DERMATOLOGY | Facility: CLINIC | Age: 73
End: 2023-10-30
Payer: MEDICARE

## 2023-10-30 PROCEDURE — 99213 OFFICE O/P EST LOW 20 MIN: CPT

## 2023-11-02 ENCOUNTER — APPOINTMENT (OUTPATIENT)
Dept: INTERNAL MEDICINE | Facility: CLINIC | Age: 73
End: 2023-11-02
Payer: MEDICARE

## 2023-11-02 DIAGNOSIS — L03.113 CELLULITIS OF RIGHT UPPER LIMB: ICD-10-CM

## 2023-11-02 DIAGNOSIS — C44.622 SQUAMOUS CELL CARCINOMA OF SKIN OF RIGHT UPPER LIMB, INCLUDING SHOULDER: ICD-10-CM

## 2023-11-02 PROCEDURE — 99495 TRANSJ CARE MGMT MOD F2F 14D: CPT

## 2023-11-02 RX ORDER — CEPHALEXIN 500 MG/1
500 CAPSULE ORAL 3 TIMES DAILY
Qty: 21 | Refills: 0 | Status: ACTIVE | COMMUNITY
Start: 2023-11-02 | End: 1900-01-01

## 2023-11-13 ENCOUNTER — APPOINTMENT (OUTPATIENT)
Dept: DERMATOLOGY | Facility: CLINIC | Age: 73
End: 2023-11-13
Payer: MEDICARE

## 2023-11-13 PROCEDURE — 83036 HEMOGLOBIN GLYCOSYLATED A1C: CPT

## 2023-11-13 PROCEDURE — G0378: CPT

## 2023-11-13 PROCEDURE — 87640 STAPH A DNA AMP PROBE: CPT

## 2023-11-13 PROCEDURE — 80076 HEPATIC FUNCTION PANEL: CPT

## 2023-11-13 PROCEDURE — 83735 ASSAY OF MAGNESIUM: CPT

## 2023-11-13 PROCEDURE — 99285 EMERGENCY DEPT VISIT HI MDM: CPT

## 2023-11-13 PROCEDURE — G1004: CPT

## 2023-11-13 PROCEDURE — 83605 ASSAY OF LACTIC ACID: CPT

## 2023-11-13 PROCEDURE — 80053 COMPREHEN METABOLIC PANEL: CPT

## 2023-11-13 PROCEDURE — 73201 CT UPPER EXTREMITY W/DYE: CPT | Mod: MF

## 2023-11-13 PROCEDURE — 84100 ASSAY OF PHOSPHORUS: CPT

## 2023-11-13 PROCEDURE — 85730 THROMBOPLASTIN TIME PARTIAL: CPT

## 2023-11-13 PROCEDURE — 80048 BASIC METABOLIC PNL TOTAL CA: CPT

## 2023-11-13 PROCEDURE — 87641 MR-STAPH DNA AMP PROBE: CPT

## 2023-11-13 PROCEDURE — 96376 TX/PRO/DX INJ SAME DRUG ADON: CPT

## 2023-11-13 PROCEDURE — 96361 HYDRATE IV INFUSION ADD-ON: CPT

## 2023-11-13 PROCEDURE — 99213 OFFICE O/P EST LOW 20 MIN: CPT

## 2023-11-13 PROCEDURE — 96374 THER/PROPH/DIAG INJ IV PUSH: CPT

## 2023-11-13 PROCEDURE — 85025 COMPLETE CBC W/AUTO DIFF WBC: CPT

## 2023-11-13 PROCEDURE — 36415 COLL VENOUS BLD VENIPUNCTURE: CPT

## 2023-11-13 PROCEDURE — 87040 BLOOD CULTURE FOR BACTERIA: CPT

## 2023-11-13 PROCEDURE — 85610 PROTHROMBIN TIME: CPT

## 2023-11-13 PROCEDURE — 96375 TX/PRO/DX INJ NEW DRUG ADDON: CPT

## 2023-11-21 PROBLEM — C44.622 PRIMARY SQUAMOUS CELL CARCINOMA OF SKIN OF RIGHT UPPER EXTREMITY: Status: ACTIVE | Noted: 2023-10-19

## 2024-05-16 ENCOUNTER — APPOINTMENT (OUTPATIENT)
Dept: DERMATOLOGY | Facility: CLINIC | Age: 74
End: 2024-05-16
Payer: MEDICARE

## 2024-05-16 PROCEDURE — 99213 OFFICE O/P EST LOW 20 MIN: CPT

## 2025-01-23 ENCOUNTER — APPOINTMENT (OUTPATIENT)
Dept: DERMATOLOGY | Facility: CLINIC | Age: 75
End: 2025-01-23
Payer: MEDICARE

## 2025-01-23 PROCEDURE — 99213 OFFICE O/P EST LOW 20 MIN: CPT

## (undated) DEVICE — BLADE SURGICAL #15 CARBON

## (undated) DEVICE — TUBING STRYKEFLOW II SUCTION / IRRIGATOR

## (undated) DEVICE — BLANKET WARMER UPPER ADULT

## (undated) DEVICE — NDL INSUF LL 150MM

## (undated) DEVICE — SNARE CAPTIVATOR II RND COLD 10MM

## (undated) DEVICE — DISSECTOR ENDOSCOPIC KITTNER SINGLE TIP

## (undated) DEVICE — D HELP - CLEARVIEW CLEARIFY SYSTEM

## (undated) DEVICE — SUT VICRYL 0 27" UR-6

## (undated) DEVICE — CANNULA COVIDIEN VERSAONE UNIVERSAL STANDARD 5MM

## (undated) DEVICE — PNEUMOPERITONEUM NDL -GYN

## (undated) DEVICE — TROCAR COVIDIEN VERSAONE OPTICAL BLADELESS 5MM

## (undated) DEVICE — NDL INSUFFLATION SURGINEEDLE 120MM

## (undated) DEVICE — GLV 8 ESTEEM BLUE

## (undated) DEVICE — TROCAR COVIDIEN VERSAPORT OPTICAL BLADELESS 12MM STD

## (undated) DEVICE — STERIS DEFENDO 3-PIECE KIT (AIR/WATER, SUCTION & BIOPSY VALVES)

## (undated) DEVICE — POLY TRAP ETRAP

## (undated) DEVICE — PACK GENERAL LAPAROSCOPY

## (undated) DEVICE — Device

## (undated) DEVICE — ENDOCATCH 10MM SPECIMEN POUCH

## (undated) DEVICE — GLV 6.5 PROTEXIS

## (undated) DEVICE — WRAP COMPRESSION CALF MED

## (undated) DEVICE — SUT MONOCRYL 4-0 27" PS-2 UNDYED

## (undated) DEVICE — GLV 8 PROTEXIS

## (undated) DEVICE — DRSG STERISTRIPS 0.5X4"

## (undated) DEVICE — SYR CONTROL LUER LOK 10CC